# Patient Record
Sex: FEMALE | Race: OTHER | NOT HISPANIC OR LATINO
[De-identification: names, ages, dates, MRNs, and addresses within clinical notes are randomized per-mention and may not be internally consistent; named-entity substitution may affect disease eponyms.]

---

## 2019-06-07 ENCOUNTER — APPOINTMENT (OUTPATIENT)
Dept: SURGERY | Facility: CLINIC | Age: 49
End: 2019-06-07
Payer: COMMERCIAL

## 2019-06-07 VITALS
SYSTOLIC BLOOD PRESSURE: 154 MMHG | OXYGEN SATURATION: 98 % | WEIGHT: 133.38 LBS | HEART RATE: 82 BPM | HEIGHT: 68 IN | TEMPERATURE: 96.26 F | BODY MASS INDEX: 20.21 KG/M2 | DIASTOLIC BLOOD PRESSURE: 81 MMHG

## 2019-06-07 PROCEDURE — 99204 OFFICE O/P NEW MOD 45 MIN: CPT

## 2019-06-07 RX ORDER — OMEPRAZOLE 20 MG/1
20 CAPSULE, DELAYED RELEASE ORAL TWICE DAILY
Qty: 30 | Refills: 0 | Status: COMPLETED | COMMUNITY
Start: 2019-06-07

## 2019-06-11 NOTE — ASSESSMENT
[FreeTextEntry1] : Pt is a 49 y/o F s/p bypass surgery in 2014 at [facility] presents today for initial consultation for frequent emesis PO and abdominal pain. Patient will undergo a CT pelvis scan to evaluate her condition. Will follow up here to discuss results and plan of care.
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
## 2019-06-11 NOTE — END OF VISIT
[FreeTextEntry3] : All medical record entries made by the Scribe were at my, Dr. Anne's direction and personally dictated by me on 06/07/2019  I have reviewed the chart and agree that the record accurately reflects my personal performance of the history, physical exam, assessment and plan. I have also personally directed, reviewed, and agreed with the chart.\par \par

## 2019-06-11 NOTE — ADDENDUM
[FreeTextEntry1] : Documented by Zaynab Keith acting as a scribe for Dr. Merrill Anne on 06/07/2019\par

## 2019-06-26 ENCOUNTER — OUTPATIENT (OUTPATIENT)
Dept: OUTPATIENT SERVICES | Facility: HOSPITAL | Age: 49
LOS: 1 days | End: 2019-06-26
Payer: COMMERCIAL

## 2019-06-26 ENCOUNTER — APPOINTMENT (OUTPATIENT)
Dept: CT IMAGING | Facility: HOSPITAL | Age: 49
End: 2019-06-26
Payer: COMMERCIAL

## 2019-06-26 PROCEDURE — 74177 CT ABD & PELVIS W/CONTRAST: CPT

## 2019-06-26 PROCEDURE — 74177 CT ABD & PELVIS W/CONTRAST: CPT | Mod: 26

## 2019-07-30 ENCOUNTER — APPOINTMENT (OUTPATIENT)
Dept: SURGERY | Facility: CLINIC | Age: 49
End: 2019-07-30
Payer: COMMERCIAL

## 2019-07-30 VITALS
HEART RATE: 84 BPM | TEMPERATURE: 205.88 F | WEIGHT: 128.38 LBS | HEIGHT: 67 IN | SYSTOLIC BLOOD PRESSURE: 103 MMHG | DIASTOLIC BLOOD PRESSURE: 66 MMHG | OXYGEN SATURATION: 98 % | BODY MASS INDEX: 20.15 KG/M2

## 2019-07-30 PROCEDURE — 99213 OFFICE O/P EST LOW 20 MIN: CPT

## 2019-09-17 ENCOUNTER — APPOINTMENT (OUTPATIENT)
Dept: SURGERY | Facility: CLINIC | Age: 49
End: 2019-09-17

## 2019-09-23 ENCOUNTER — OUTPATIENT (OUTPATIENT)
Dept: OUTPATIENT SERVICES | Facility: HOSPITAL | Age: 49
LOS: 1 days | Discharge: HOME | End: 2019-09-23
Payer: COMMERCIAL

## 2019-09-23 ENCOUNTER — RESULT REVIEW (OUTPATIENT)
Age: 49
End: 2019-09-23

## 2019-09-23 PROCEDURE — 91035 G-ESOPH REFLX TST W/ELECTROD: CPT | Mod: 26

## 2019-09-23 PROCEDURE — 43239 EGD BIOPSY SINGLE/MULTIPLE: CPT

## 2019-09-24 LAB
SURGICAL PATHOLOGY STUDY: SIGNIFICANT CHANGE UP
SURGICAL PATHOLOGY STUDY: SIGNIFICANT CHANGE UP

## 2019-10-18 ENCOUNTER — APPOINTMENT (OUTPATIENT)
Dept: SURGERY | Facility: CLINIC | Age: 49
End: 2019-10-18
Payer: MEDICAID

## 2019-10-18 VITALS
BODY MASS INDEX: 21.19 KG/M2 | WEIGHT: 135 LBS | HEIGHT: 67 IN | SYSTOLIC BLOOD PRESSURE: 119 MMHG | DIASTOLIC BLOOD PRESSURE: 74 MMHG | TEMPERATURE: 205.7 F | HEART RATE: 81 BPM | OXYGEN SATURATION: 99 %

## 2019-10-18 DIAGNOSIS — K21.9 GASTRO-ESOPHAGEAL REFLUX DISEASE W/OUT ESOPHAGITIS: ICD-10-CM

## 2019-10-18 PROCEDURE — 99213 OFFICE O/P EST LOW 20 MIN: CPT

## 2019-10-18 NOTE — ASSESSMENT
[FreeTextEntry1] : 47 y/o F s/p gastric bypass in 2014 at [facility] by . Patient is upset that she cannot tolerate PO intake post-operatively. CT scan results reviewed shows a small lesion on her liver but otherwise WNL otherwise. Given that CT work up did not reveal any causes of her abdominal pain and recurrent vomiting, will have patient undergo endoscopy to assess her GI tract for any abnormality. Will return here after to discuss results and plan of care.

## 2019-10-18 NOTE — HISTORY OF PRESENT ILLNESS
[de-identified] : 49 y/o F s/p gastric bypass (2014, at [facility] by ) presents today for follow up on her abdominal pain and recurrent vomiting. She reports here feeling well. She states that she has completed her CT scan and presents here to review the results. She continues to experience PO intolerance, experiencing abdominal pain and vomiting postprandially. She states that she has had an endoscopy completed previously, about less than 6 months ago at [facility]. \par \par \par

## 2019-10-18 NOTE — ADDENDUM
[FreeTextEntry1] : Documented by Zaynab Keith acting as a scribe for Dr. Merrill Anne on 07/30/2019\par

## 2019-10-18 NOTE — END OF VISIT
[FreeTextEntry3] : All medical record entries made by the Scribe were at my, Dr. Anne's direction and personally dictated by me on 07/30/2019  I have reviewed the chart and agree that the record accurately reflects my personal performance of the history, physical exam, assessment and plan. I have also personally directed, reviewed, and agreed with the chart.\par \par

## 2019-10-22 PROBLEM — K21.9 CHRONIC GERD: Status: ACTIVE | Noted: 2019-10-22

## 2019-10-22 NOTE — PLAN
[FreeTextEntry1] : Revision  for medical necessity  with repair of hiatal hernia and  and  pouch revision

## 2019-10-22 NOTE — HISTORY OF PRESENT ILLNESS
[de-identified] : 49 y/o F s/p gastric bypass (2014, at [facility] by ) presents today for follow up on her abdominal pain and recurrent vomiting. Pt recently went for egd lobato with , 2cm hiatal hernia, no ulcerations, Demeester score of 52. Results reviewed with pt. States that since the procedure, she has noticed a decrease in her vomiting by 2-3 days weekly. States that she has gained 7 lbs since last visit. Reports eating rice and steak, explained to pt those foods are not well tolerated after bypass surgery, and to eliminate those foods replacing with quinoa and a ground beef to see if symptoms improve. Path negative for metaplasia/dysplasia. Pt taking vitamins daily as directed.

## 2019-10-31 ENCOUNTER — FORM ENCOUNTER (OUTPATIENT)
Age: 49
End: 2019-10-31

## 2019-11-01 ENCOUNTER — APPOINTMENT (OUTPATIENT)
Dept: HEART AND VASCULAR | Facility: CLINIC | Age: 49
End: 2019-11-01
Payer: COMMERCIAL

## 2019-11-01 ENCOUNTER — OUTPATIENT (OUTPATIENT)
Dept: OUTPATIENT SERVICES | Facility: HOSPITAL | Age: 49
LOS: 1 days | End: 2019-11-01
Payer: COMMERCIAL

## 2019-11-01 VITALS
WEIGHT: 135 LBS | HEIGHT: 67 IN | SYSTOLIC BLOOD PRESSURE: 120 MMHG | HEART RATE: 60 BPM | OXYGEN SATURATION: 98 % | BODY MASS INDEX: 21.19 KG/M2 | DIASTOLIC BLOOD PRESSURE: 70 MMHG

## 2019-11-01 PROCEDURE — 93000 ELECTROCARDIOGRAM COMPLETE: CPT

## 2019-11-01 PROCEDURE — 36415 COLL VENOUS BLD VENIPUNCTURE: CPT

## 2019-11-01 PROCEDURE — 99204 OFFICE O/P NEW MOD 45 MIN: CPT

## 2019-11-01 PROCEDURE — 71046 X-RAY EXAM CHEST 2 VIEWS: CPT

## 2019-11-01 PROCEDURE — 71046 X-RAY EXAM CHEST 2 VIEWS: CPT | Mod: 26

## 2019-11-01 RX ORDER — OMEPRAZOLE 20 MG/1
20 CAPSULE, DELAYED RELEASE ORAL TWICE DAILY
Qty: 30 | Refills: 0 | Status: DISCONTINUED | COMMUNITY
Start: 2019-06-07 | End: 2019-11-01

## 2019-11-01 RX ORDER — METOCLOPRAMIDE 5 MG/1
5 TABLET ORAL 3 TIMES DAILY
Qty: 30 | Refills: 0 | Status: DISCONTINUED | COMMUNITY
Start: 2019-06-07 | End: 2019-11-01

## 2019-11-01 RX ORDER — PYRIDOSTIGMINE BROMIDE 60 MG/1
60 TABLET ORAL 3 TIMES DAILY
Qty: 30 | Refills: 0 | Status: DISCONTINUED | COMMUNITY
Start: 2019-06-07 | End: 2019-11-01

## 2019-11-01 NOTE — PHYSICAL EXAM
[General Appearance - Well Developed] : well developed [Normal Appearance] : normal appearance [Well Groomed] : well groomed [General Appearance - Well Nourished] : well nourished [No Deformities] : no deformities [General Appearance - In No Acute Distress] : no acute distress [Normal Conjunctiva] : the conjunctiva exhibited no abnormalities [Eyelids - No Xanthelasma] : the eyelids demonstrated no xanthelasmas [Normal Oral Mucosa] : normal oral mucosa [No Oral Pallor] : no oral pallor [No Oral Cyanosis] : no oral cyanosis [Normal Jugular Venous A Waves Present] : normal jugular venous A waves present [Normal Jugular Venous V Waves Present] : normal jugular venous V waves present [No Jugular Venous Gates A Waves] : no jugular venous gates A waves [Respiration, Rhythm And Depth] : normal respiratory rhythm and effort [Exaggerated Use Of Accessory Muscles For Inspiration] : no accessory muscle use [Auscultation Breath Sounds / Voice Sounds] : lungs were clear to auscultation bilaterally [Heart Rate And Rhythm] : heart rate and rhythm were normal [Heart Sounds] : normal S1 and S2 [Murmurs] : no murmurs present [Abdomen Soft] : soft [Abdomen Tenderness] : non-tender [Abdomen Mass (___ Cm)] : no abdominal mass palpated [Abnormal Walk] : normal gait [Gait - Sufficient For Exercise Testing] : the gait was sufficient for exercise testing [Nail Clubbing] : no clubbing of the fingernails [Cyanosis, Localized] : no localized cyanosis [Petechial Hemorrhages (___cm)] : no petechial hemorrhages [] : no ischemic changes [Oriented To Time, Place, And Person] : oriented to person, place, and time [Affect] : the affect was normal [Mood] : the mood was normal [No Anxiety] : not feeling anxious

## 2019-11-01 NOTE — HISTORY OF PRESENT ILLNESS
[FreeTextEntry1] : for evaluation hiatal hernia repair and fundoplication\par good functional capacity\par no h/o bleeding tendencies

## 2019-11-04 LAB
ALBUMIN SERPL ELPH-MCNC: 4.5 G/DL
ALP BLD-CCNC: 87 U/L
ALT SERPL-CCNC: 10 U/L
ANION GAP SERPL CALC-SCNC: 11 MMOL/L
APPEARANCE: CLEAR
APTT BLD: 32.3 SEC
AST SERPL-CCNC: 13 U/L
BACTERIA: NEGATIVE
BASOPHILS # BLD AUTO: 0.03 K/UL
BASOPHILS NFR BLD AUTO: 0.3 %
BILIRUB SERPL-MCNC: 0.3 MG/DL
BILIRUBIN URINE: NEGATIVE
BLOOD URINE: NEGATIVE
BUN SERPL-MCNC: 11 MG/DL
CALCIUM SERPL-MCNC: 10.2 MG/DL
CHLORIDE SERPL-SCNC: 103 MMOL/L
CO2 SERPL-SCNC: 29 MMOL/L
COLOR: YELLOW
CREAT SERPL-MCNC: 0.64 MG/DL
EOSINOPHIL # BLD AUTO: 0.15 K/UL
EOSINOPHIL NFR BLD AUTO: 1.7 %
ESTIMATED AVERAGE GLUCOSE: 108 MG/DL
GLUCOSE QUALITATIVE U: NEGATIVE
GLUCOSE SERPL-MCNC: 85 MG/DL
HBA1C MFR BLD HPLC: 5.4 %
HCT VFR BLD CALC: 40.5 %
HGB BLD-MCNC: 12.1 G/DL
HYALINE CASTS: 0 /LPF
IMM GRANULOCYTES NFR BLD AUTO: 0.2 %
INR PPP: 0.95 RATIO
KETONES URINE: NEGATIVE
LEUKOCYTE ESTERASE URINE: NEGATIVE
LYMPHOCYTES # BLD AUTO: 2.06 K/UL
LYMPHOCYTES NFR BLD AUTO: 23.8 %
MAN DIFF?: NORMAL
MCHC RBC-ENTMCNC: 28.5 PG
MCHC RBC-ENTMCNC: 29.9 GM/DL
MCV RBC AUTO: 95.5 FL
MICROSCOPIC-UA: NORMAL
MONOCYTES # BLD AUTO: 0.34 K/UL
MONOCYTES NFR BLD AUTO: 3.9 %
NEUTROPHILS # BLD AUTO: 6.04 K/UL
NEUTROPHILS NFR BLD AUTO: 70.1 %
NITRITE URINE: NEGATIVE
PH URINE: 6.5
PLATELET # BLD AUTO: 231 K/UL
POTASSIUM SERPL-SCNC: 4.9 MMOL/L
PROT SERPL-MCNC: 6.9 G/DL
PROTEIN URINE: NEGATIVE
PT BLD: 10.9 SEC
RBC # BLD: 4.24 M/UL
RBC # FLD: 14 %
RED BLOOD CELLS URINE: 4 /HPF
SODIUM SERPL-SCNC: 143 MMOL/L
SPECIFIC GRAVITY URINE: 1.02
SQUAMOUS EPITHELIAL CELLS: 0 /HPF
T4 FREE SERPL-MCNC: 1.2 NG/DL
TSH SERPL-ACNC: 5.43 UIU/ML
UROBILINOGEN URINE: NORMAL
WBC # FLD AUTO: 8.64 K/UL
WHITE BLOOD CELLS URINE: 1 /HPF

## 2019-11-13 VITALS
RESPIRATION RATE: 16 BRPM | TEMPERATURE: 98 F | OXYGEN SATURATION: 98 % | HEART RATE: 55 BPM | SYSTOLIC BLOOD PRESSURE: 105 MMHG | DIASTOLIC BLOOD PRESSURE: 68 MMHG | HEIGHT: 68 IN | WEIGHT: 136.47 LBS

## 2019-11-13 NOTE — ASU PATIENT PROFILE, ADULT - PMH
Depression    GERD (gastroesophageal reflux disease)  chronic  Helicobacter pylori (H. pylori) infection    Hiatal hernia with GERD    Hypothyroid    Varicose veins Depression    GERD (gastroesophageal reflux disease)  chronic  Helicobacter pylori (H. pylori) infection    Hiatal hernia with GERD    Hypothyroid    Morbid obesity    Varicose veins

## 2019-11-14 ENCOUNTER — INPATIENT (INPATIENT)
Facility: HOSPITAL | Age: 49
LOS: 0 days | Discharge: HOME | DRG: 328 | End: 2019-11-15
Attending: SURGERY | Admitting: SURGERY
Payer: COMMERCIAL

## 2019-11-14 ENCOUNTER — APPOINTMENT (OUTPATIENT)
Dept: SURGERY | Facility: HOSPITAL | Age: 49
End: 2019-11-14

## 2019-11-14 DIAGNOSIS — Z98.84 BARIATRIC SURGERY STATUS: Chronic | ICD-10-CM

## 2019-11-14 DIAGNOSIS — Z98.890 OTHER SPECIFIED POSTPROCEDURAL STATES: Chronic | ICD-10-CM

## 2019-11-14 DIAGNOSIS — Z41.9 ENCOUNTER FOR PROCEDURE FOR PURPOSES OTHER THAN REMEDYING HEALTH STATE, UNSPECIFIED: Chronic | ICD-10-CM

## 2019-11-14 PROCEDURE — 43280 LAPAROSCOPY FUNDOPLASTY: CPT | Mod: GC

## 2019-11-14 PROCEDURE — S2900 ROBOTIC SURGICAL SYSTEM: CPT | Mod: NC

## 2019-11-14 RX ORDER — HYDROMORPHONE HYDROCHLORIDE 2 MG/ML
1 INJECTION INTRAMUSCULAR; INTRAVENOUS; SUBCUTANEOUS EVERY 4 HOURS
Refills: 0 | Status: DISCONTINUED | OUTPATIENT
Start: 2019-11-14 | End: 2019-11-15

## 2019-11-14 RX ORDER — SODIUM CHLORIDE 9 MG/ML
1000 INJECTION, SOLUTION INTRAVENOUS
Refills: 0 | Status: DISCONTINUED | OUTPATIENT
Start: 2019-11-14 | End: 2019-11-15

## 2019-11-14 RX ORDER — ENOXAPARIN SODIUM 100 MG/ML
40 INJECTION SUBCUTANEOUS DAILY
Refills: 0 | Status: DISCONTINUED | OUTPATIENT
Start: 2019-11-14 | End: 2019-11-15

## 2019-11-14 RX ORDER — LEVOTHYROXINE SODIUM 125 MCG
125 TABLET ORAL ONCE
Refills: 0 | Status: DISCONTINUED | OUTPATIENT
Start: 2019-11-14 | End: 2019-11-14

## 2019-11-14 RX ORDER — ONDANSETRON 8 MG/1
4 TABLET, FILM COATED ORAL EVERY 6 HOURS
Refills: 0 | Status: DISCONTINUED | OUTPATIENT
Start: 2019-11-14 | End: 2019-11-15

## 2019-11-14 RX ORDER — HYDROMORPHONE HYDROCHLORIDE 2 MG/ML
0.5 INJECTION INTRAMUSCULAR; INTRAVENOUS; SUBCUTANEOUS ONCE
Refills: 0 | Status: DISCONTINUED | OUTPATIENT
Start: 2019-11-14 | End: 2019-11-14

## 2019-11-14 RX ORDER — METOCLOPRAMIDE HCL 10 MG
10 TABLET ORAL EVERY 6 HOURS
Refills: 0 | Status: DISCONTINUED | OUTPATIENT
Start: 2019-11-14 | End: 2019-11-15

## 2019-11-14 RX ORDER — ONDANSETRON 8 MG/1
4 TABLET, FILM COATED ORAL ONCE
Refills: 0 | Status: DISCONTINUED | OUTPATIENT
Start: 2019-11-14 | End: 2019-11-14

## 2019-11-14 RX ORDER — HYDROMORPHONE HYDROCHLORIDE 2 MG/ML
0.5 INJECTION INTRAMUSCULAR; INTRAVENOUS; SUBCUTANEOUS EVERY 4 HOURS
Refills: 0 | Status: DISCONTINUED | OUTPATIENT
Start: 2019-11-14 | End: 2019-11-14

## 2019-11-14 RX ORDER — ACETAMINOPHEN 500 MG
1000 TABLET ORAL ONCE
Refills: 0 | Status: COMPLETED | OUTPATIENT
Start: 2019-11-15 | End: 2019-11-15

## 2019-11-14 RX ORDER — PANTOPRAZOLE SODIUM 20 MG/1
40 TABLET, DELAYED RELEASE ORAL DAILY
Refills: 0 | Status: DISCONTINUED | OUTPATIENT
Start: 2019-11-14 | End: 2019-11-15

## 2019-11-14 RX ORDER — HYDROMORPHONE HYDROCHLORIDE 2 MG/ML
1 INJECTION INTRAMUSCULAR; INTRAVENOUS; SUBCUTANEOUS ONCE
Refills: 0 | Status: DISCONTINUED | OUTPATIENT
Start: 2019-11-14 | End: 2019-11-14

## 2019-11-14 RX ORDER — LANOLIN ALCOHOL/MO/W.PET/CERES
5 CREAM (GRAM) TOPICAL AT BEDTIME
Refills: 0 | Status: DISCONTINUED | OUTPATIENT
Start: 2019-11-14 | End: 2019-11-15

## 2019-11-14 RX ORDER — KETOROLAC TROMETHAMINE 30 MG/ML
15 SYRINGE (ML) INJECTION EVERY 6 HOURS
Refills: 0 | Status: DISCONTINUED | OUTPATIENT
Start: 2019-11-14 | End: 2019-11-15

## 2019-11-14 RX ORDER — LEVOTHYROXINE SODIUM 125 MCG
125 TABLET ORAL DAILY
Refills: 0 | Status: DISCONTINUED | OUTPATIENT
Start: 2019-11-14 | End: 2019-11-15

## 2019-11-14 RX ORDER — ACETAMINOPHEN 500 MG
1000 TABLET ORAL ONCE
Refills: 0 | Status: COMPLETED | OUTPATIENT
Start: 2019-11-14 | End: 2019-11-14

## 2019-11-14 RX ADMIN — HYDROMORPHONE HYDROCHLORIDE 0.5 MILLIGRAM(S): 2 INJECTION INTRAMUSCULAR; INTRAVENOUS; SUBCUTANEOUS at 11:50

## 2019-11-14 RX ADMIN — Medication 400 MILLIGRAM(S): at 17:11

## 2019-11-14 RX ADMIN — Medication 1000 MILLIGRAM(S): at 18:00

## 2019-11-14 RX ADMIN — HYDROMORPHONE HYDROCHLORIDE 1 MILLIGRAM(S): 2 INJECTION INTRAMUSCULAR; INTRAVENOUS; SUBCUTANEOUS at 12:36

## 2019-11-14 RX ADMIN — Medication 15 MILLIGRAM(S): at 19:20

## 2019-11-14 RX ADMIN — HYDROMORPHONE HYDROCHLORIDE 1 MILLIGRAM(S): 2 INJECTION INTRAMUSCULAR; INTRAVENOUS; SUBCUTANEOUS at 15:27

## 2019-11-14 RX ADMIN — Medication 15 MILLIGRAM(S): at 13:30

## 2019-11-14 RX ADMIN — PANTOPRAZOLE SODIUM 40 MILLIGRAM(S): 20 TABLET, DELAYED RELEASE ORAL at 13:00

## 2019-11-14 RX ADMIN — ENOXAPARIN SODIUM 40 MILLIGRAM(S): 100 INJECTION SUBCUTANEOUS at 17:11

## 2019-11-14 RX ADMIN — HYDROMORPHONE HYDROCHLORIDE 1 MILLIGRAM(S): 2 INJECTION INTRAMUSCULAR; INTRAVENOUS; SUBCUTANEOUS at 16:40

## 2019-11-14 RX ADMIN — Medication 5 MILLIGRAM(S): at 23:02

## 2019-11-14 RX ADMIN — Medication 15 MILLIGRAM(S): at 18:39

## 2019-11-14 RX ADMIN — HYDROMORPHONE HYDROCHLORIDE 0.5 MILLIGRAM(S): 2 INJECTION INTRAMUSCULAR; INTRAVENOUS; SUBCUTANEOUS at 23:02

## 2019-11-14 RX ADMIN — Medication 15 MILLIGRAM(S): at 13:20

## 2019-11-14 NOTE — BRIEF OPERATIVE NOTE - NSICDXBRIEFPOSTOP_GEN_ALL_CORE_FT
POST-OP DIAGNOSIS:  S/P gastric bypass 14-Nov-2019 11:49:39  Randall Flanagan  Hiatal hernia with GERD 14-Nov-2019 11:49:35  Randall Flanagan

## 2019-11-14 NOTE — PROGRESS NOTE ADULT - SUBJECTIVE AND OBJECTIVE BOX
POST-OPERATIVE NOTE    Procedure: Robotic assisted Nissen fundoplication and hiatal hernia repair     Diagnosis/Indication: Sliding Hiatal hernia and GERD    Surgeon: Dr. Anne     S: Pt has no complaints. Denies CP, SOB, TORRES, calf tenderness. Pain controlled with medication.    O:  T(C): 36.6 (11-14-19 @ 13:30), Max: 37.1 (11-14-19 @ 11:42)  T(F): 97.8 (11-14-19 @ 13:30), Max: 98.7 (11-14-19 @ 11:42)  HR: 60 (11-14-19 @ 13:30) (58 - 68)  BP: 137/74 (11-14-19 @ 13:30) (124/73 - 146/82)  RR: 16 (11-14-19 @ 13:30) (14 - 31)  SpO2: 100% (11-14-19 @ 13:30) (97% - 100%)  Wt(kg): --            Gen: NAD, resting comfortably in bed  C/V: NSR  Pulm: Nonlabored breathing, no respiratory distress  Abd: Soft, non-distended, TTP around incision site, incision clean dry and intact  Extrem: WWP, no calf edema, SCDs in place POST-OPERATIVE NOTE    Procedure: Robotic assisted Nissen fundoplication    Diagnosis/Indication: Sliding Hiatal hernia and GERD    Surgeon: Dr. Anne     S: Pt has no complaints. Denies CP, SOB, TORRES, calf tenderness. Pain controlled with medication.    O:  T(C): 36.6 (11-14-19 @ 13:30), Max: 37.1 (11-14-19 @ 11:42)  T(F): 97.8 (11-14-19 @ 13:30), Max: 98.7 (11-14-19 @ 11:42)  HR: 60 (11-14-19 @ 13:30) (58 - 68)  BP: 137/74 (11-14-19 @ 13:30) (124/73 - 146/82)  RR: 16 (11-14-19 @ 13:30) (14 - 31)  SpO2: 100% (11-14-19 @ 13:30) (97% - 100%)  Wt(kg): --            Gen: NAD, resting comfortably in bed  C/V: NSR  Pulm: Nonlabored breathing, no respiratory distress  Abd: Soft, non-distended, TTP around incision site, incision clean dry and intact  Extrem: WWP, no calf edema, SCDs in place

## 2019-11-14 NOTE — BRIEF OPERATIVE NOTE - OPERATION/FINDINGS
Adhesions between liver and gastric pouch taken down. Esophagus mobilized, moderate sized hiatal hernia, repaired with non-absorbable barbed suture. Gastric remnant mobilized and Nissen fundoplication performed. Confirmed good perfusion to wrap and stomach by ICG fluorescence angiography.

## 2019-11-14 NOTE — H&P PST ADULT - ASSESSMENT
49F with history of gastric bypass with subsequent development of GERD and hiatal hernia presents electively for robotic-assisted hiatal hernia repair and fundoplication.   - Admit to surgery, regional bed  - OR for above procedure

## 2019-11-14 NOTE — PROGRESS NOTE ADULT - ASSESSMENT
49 year old female with history of gastric bypass presents for sliding hiatal hernia repair. Patient s/p Robotic assisted Nissen fundoplication and hiatal hernia repair.    -Pain/nausea control  -CLD, IVF  -Protonix  -Lovenox DVT ppx   -SCDs, OOB/A, IS  -AM labs  -f/u am labs   -richardson to gravity   -UGI AM 49 year old female with history of gastric bypass presents for sliding hiatal hernia repair. Patient s/p Robotic assisted Nissen fundoplication .    -Pain/nausea control  -CLD, IVF  -Protonix  -Lovenox DVT ppx   -SCDs, OOB/A, IS  -AM labs  -f/u am labs   -richardson to gravity   -UGI AM

## 2019-11-14 NOTE — BRIEF OPERATIVE NOTE - NSICDXBRIEFPREOP_GEN_ALL_CORE_FT
PRE-OP DIAGNOSIS:  S/P gastric bypass 14-Nov-2019 11:49:22  Randall Flanagan  Hiatal hernia with GERD 14-Nov-2019 11:49:13  Randall Flanagan

## 2019-11-14 NOTE — H&P PST ADULT - NSICDXPASTSURGICALHX_GEN_ALL_CORE_FT
PAST SURGICAL HISTORY:  Elective surgery left wrist    H/O arthroscopy of right knee     H/O gastric bypass 2014 at [facility]

## 2019-11-14 NOTE — PACU DISCHARGE NOTE - COMMENTS
Patient meets criteria for patient discharge, A&Ox4, Pain controlled with medication as ordered, VSS, X5 Lap sites clean, dry and intact-No drainage, No swelling, No redness, No hematomas, #16F Strong in place draining clear yellow urine, Patient has no nausea/vomiting, No chest pain, No SOB or difficulty breathing, moving all extremities, abdomen is flat, symmetrical distention with positive bowel sounds in all quadrants, Report given to Og on 9 Uris, patient is transported by bed.

## 2019-11-14 NOTE — H&P PST ADULT - HISTORY OF PRESENT ILLNESS
Patient is a 49 year old female with history of gastric bypass in 2014 and symptoms of GERD, with abdominal pain and vomiting. She had a pre-op workup with EGD and pH monitoring that revealed a 2cm hiatal hernia and was consistent with acid reflux, with a Demeester score of 52. SHe now presents electively for a robotic-assisted hiatal hernia repair and fundoplication. No complaints at present.

## 2019-11-14 NOTE — H&P PST ADULT - NSICDXPASTMEDICALHX_GEN_ALL_CORE_FT
PAST MEDICAL HISTORY:  Depression     GERD (gastroesophageal reflux disease) chronic    Helicobacter pylori (H. pylori) infection     Hiatal hernia with GERD     Hypothyroid     Morbid obesity     Varicose veins

## 2019-11-14 NOTE — BRIEF OPERATIVE NOTE - NSICDXBRIEFPROCEDURE_GEN_ALL_CORE_FT
PROCEDURES:  Nissen fundoplication, robotic assisted 14-Nov-2019 11:49:00  Randall Flanagan  Robot-assisted repair of sliding hiatal hernia 14-Nov-2019 11:48:42  Randall Flanagan

## 2019-11-15 ENCOUNTER — TRANSCRIPTION ENCOUNTER (OUTPATIENT)
Age: 49
End: 2019-11-15

## 2019-11-15 VITALS
OXYGEN SATURATION: 96 % | RESPIRATION RATE: 16 BRPM | SYSTOLIC BLOOD PRESSURE: 107 MMHG | DIASTOLIC BLOOD PRESSURE: 61 MMHG | TEMPERATURE: 98 F | HEART RATE: 58 BPM

## 2019-11-15 LAB
ANION GAP SERPL CALC-SCNC: 7 MMOL/L — SIGNIFICANT CHANGE UP (ref 5–17)
BUN SERPL-MCNC: 8 MG/DL — SIGNIFICANT CHANGE UP (ref 7–23)
CALCIUM SERPL-MCNC: 8.8 MG/DL — SIGNIFICANT CHANGE UP (ref 8.4–10.5)
CHLORIDE SERPL-SCNC: 104 MMOL/L — SIGNIFICANT CHANGE UP (ref 96–108)
CO2 SERPL-SCNC: 28 MMOL/L — SIGNIFICANT CHANGE UP (ref 22–31)
CREAT SERPL-MCNC: 0.56 MG/DL — SIGNIFICANT CHANGE UP (ref 0.5–1.3)
GLUCOSE SERPL-MCNC: 95 MG/DL — SIGNIFICANT CHANGE UP (ref 70–99)
HCT VFR BLD CALC: 30.8 % — LOW (ref 34.5–45)
HGB BLD-MCNC: 9.8 G/DL — LOW (ref 11.5–15.5)
MAGNESIUM SERPL-MCNC: 1.7 MG/DL — SIGNIFICANT CHANGE UP (ref 1.6–2.6)
MCHC RBC-ENTMCNC: 29 PG — SIGNIFICANT CHANGE UP (ref 27–34)
MCHC RBC-ENTMCNC: 31.8 GM/DL — LOW (ref 32–36)
MCV RBC AUTO: 91.1 FL — SIGNIFICANT CHANGE UP (ref 80–100)
NRBC # BLD: 0 /100 WBCS — SIGNIFICANT CHANGE UP (ref 0–0)
PHOSPHATE SERPL-MCNC: 3.1 MG/DL — SIGNIFICANT CHANGE UP (ref 2.5–4.5)
PLATELET # BLD AUTO: 141 K/UL — LOW (ref 150–400)
POTASSIUM SERPL-MCNC: 3.9 MMOL/L — SIGNIFICANT CHANGE UP (ref 3.5–5.3)
POTASSIUM SERPL-SCNC: 3.9 MMOL/L — SIGNIFICANT CHANGE UP (ref 3.5–5.3)
RBC # BLD: 3.38 M/UL — LOW (ref 3.8–5.2)
RBC # FLD: 13.7 % — SIGNIFICANT CHANGE UP (ref 10.3–14.5)
SODIUM SERPL-SCNC: 139 MMOL/L — SIGNIFICANT CHANGE UP (ref 135–145)
WBC # BLD: 9.82 K/UL — SIGNIFICANT CHANGE UP (ref 3.8–10.5)
WBC # FLD AUTO: 9.82 K/UL — SIGNIFICANT CHANGE UP (ref 3.8–10.5)

## 2019-11-15 PROCEDURE — 74241: CPT | Mod: 26

## 2019-11-15 RX ORDER — NICOTINE POLACRILEX 2 MG
1 GUM BUCCAL
Qty: 7 | Refills: 0
Start: 2019-11-15 | End: 2019-11-21

## 2019-11-15 RX ORDER — MAGNESIUM SULFATE 500 MG/ML
1 VIAL (ML) INJECTION ONCE
Refills: 0 | Status: COMPLETED | OUTPATIENT
Start: 2019-11-15 | End: 2019-11-15

## 2019-11-15 RX ORDER — SODIUM CHLORIDE 9 MG/ML
1000 INJECTION, SOLUTION INTRAVENOUS
Refills: 0 | Status: DISCONTINUED | OUTPATIENT
Start: 2019-11-15 | End: 2019-11-15

## 2019-11-15 RX ORDER — PANTOPRAZOLE SODIUM 20 MG/1
1 TABLET, DELAYED RELEASE ORAL
Qty: 7 | Refills: 0
Start: 2019-11-15 | End: 2019-11-21

## 2019-11-15 RX ADMIN — HYDROMORPHONE HYDROCHLORIDE 1 MILLIGRAM(S): 2 INJECTION INTRAMUSCULAR; INTRAVENOUS; SUBCUTANEOUS at 16:43

## 2019-11-15 RX ADMIN — Medication 15 MILLIGRAM(S): at 01:20

## 2019-11-15 RX ADMIN — HYDROMORPHONE HYDROCHLORIDE 1 MILLIGRAM(S): 2 INJECTION INTRAMUSCULAR; INTRAVENOUS; SUBCUTANEOUS at 12:09

## 2019-11-15 RX ADMIN — Medication 15 MILLIGRAM(S): at 05:41

## 2019-11-15 RX ADMIN — Medication 15 MILLIGRAM(S): at 00:26

## 2019-11-15 RX ADMIN — Medication 100 GRAM(S): at 08:54

## 2019-11-15 RX ADMIN — Medication 1000 MILLIGRAM(S): at 01:20

## 2019-11-15 RX ADMIN — HYDROMORPHONE HYDROCHLORIDE 1 MILLIGRAM(S): 2 INJECTION INTRAMUSCULAR; INTRAVENOUS; SUBCUTANEOUS at 06:25

## 2019-11-15 RX ADMIN — Medication 400 MILLIGRAM(S): at 05:41

## 2019-11-15 RX ADMIN — Medication 15 MILLIGRAM(S): at 13:00

## 2019-11-15 RX ADMIN — Medication 400 MILLIGRAM(S): at 00:26

## 2019-11-15 RX ADMIN — Medication 15 MILLIGRAM(S): at 12:10

## 2019-11-15 RX ADMIN — Medication 1000 MILLIGRAM(S): at 06:20

## 2019-11-15 RX ADMIN — Medication 125 MICROGRAM(S): at 05:41

## 2019-11-15 RX ADMIN — ENOXAPARIN SODIUM 40 MILLIGRAM(S): 100 INJECTION SUBCUTANEOUS at 12:09

## 2019-11-15 RX ADMIN — Medication 15 MILLIGRAM(S): at 06:20

## 2019-11-15 RX ADMIN — HYDROMORPHONE HYDROCHLORIDE 1 MILLIGRAM(S): 2 INJECTION INTRAMUSCULAR; INTRAVENOUS; SUBCUTANEOUS at 07:00

## 2019-11-15 RX ADMIN — HYDROMORPHONE HYDROCHLORIDE 1 MILLIGRAM(S): 2 INJECTION INTRAMUSCULAR; INTRAVENOUS; SUBCUTANEOUS at 13:00

## 2019-11-15 RX ADMIN — PANTOPRAZOLE SODIUM 40 MILLIGRAM(S): 20 TABLET, DELAYED RELEASE ORAL at 12:09

## 2019-11-15 RX ADMIN — HYDROMORPHONE HYDROCHLORIDE 0.5 MILLIGRAM(S): 2 INJECTION INTRAMUSCULAR; INTRAVENOUS; SUBCUTANEOUS at 00:00

## 2019-11-15 NOTE — DISCHARGE NOTE PROVIDER - NSDCCPCAREPLAN_GEN_ALL_CORE_FT
PRINCIPAL DISCHARGE DIAGNOSIS  Diagnosis: GERD (gastroesophageal reflux disease)  Assessment and Plan of Treatment:

## 2019-11-15 NOTE — DISCHARGE NOTE PROVIDER - HOSPITAL COURSE
49F with history of gastric bypass with subsequent development of GERD and hiatal hernia presented electively for robotic-assisted hiatal hernia repair and fundoplication on 11/14. Procedure was uncomplicated and post-op course was uneventful. UGI on POD#1 was negative for leak or obstruction and diet was advanced to clear liquids. At time of discharge the patient was tolerating an adequate amount of po intake and was stable and ready for discharge with follow-up as outpatient.

## 2019-11-15 NOTE — PROGRESS NOTE ADULT - ASSESSMENT
49 year old female with history of gastric bypass,  s/p  robotic assisted Nissen fundoplication and hiatal hernia repair.    -Pain/nausea control  -CLD, IVF  -Protonix  -Lovenox DVT ppx   -SCDs, OOB/A, IS  -AM labs  -UGI this AM  -richardson out, TOV  -dispo pending UGI results and PO tolerance

## 2019-11-15 NOTE — PROGRESS NOTE ADULT - SUBJECTIVE AND OBJECTIVE BOX
POD: 1    Procedure: Robotic assisted Nissen fundoplication and hiatal hernia repair    SUBJECTIVE: Patient seen and examined by chief resident on morning rounds. Resting comfortably in bed. Reports having pain overnight but states that it is currently well controlled. Tolerated clears yesterday with no n/v.       Vital Signs Last 24 Hrs  T(C): 37 (15 Nov 2019 08:50), Max: 37.1 (14 Nov 2019 11:42)  T(F): 98.6 (15 Nov 2019 08:50), Max: 98.8 (15 Nov 2019 00:26)  HR: 60 (15 Nov 2019 08:50) (57 - 68)  BP: 127/81 (15 Nov 2019 08:50) (110/69 - 146/84)  BP(mean): 93 (14 Nov 2019 13:45) (93 - 108)  RR: 16 (15 Nov 2019 08:50) (14 - 31)  SpO2: 97% (15 Nov 2019 08:50) (96% - 100%)    Physical Exam:  General: NAD  Pulmonary: Nonlabored breathing, no respiratory distress  Abdominal: soft, minimally distended, appropriately tender with no rebound or guarding. incisions CDI   Extremities: WWP, normal strength, no clubbing/cyanosis/edema  Neuro: A/O x3    Lines/drains/tubes:    I&O's Summary    14 Nov 2019 07:01  -  15 Nov 2019 07:00  --------------------------------------------------------  IN: 2350 mL / OUT: 2720 mL / NET: -370 mL        LABS:                        9.8    9.82  )-----------( 141      ( 15 Nov 2019 07:12 )             30.8     11-15    139  |  104  |  8   ----------------------------<  95  3.9   |  28  |  0.56    Ca    8.8      15 Nov 2019 07:12  Phos  3.1     11-15  Mg     1.7     11-15          CAPILLARY BLOOD GLUCOSE            RADIOLOGY & ADDITIONAL STUDIES:

## 2019-11-15 NOTE — DISCHARGE NOTE PROVIDER - NSDCMRMEDTOKEN_GEN_ALL_CORE_FT
Synthroid 125 mcg (0.125 mg) oral tablet: 1 tab(s) orally once a day  Therapeutic Multiple Vitamins oral tablet: 1 tab(s) orally once a day Nicoderm C-Q 7 mg/24 hr transdermal film, extended release: 1 patch transdermally once a day MDD:1 patch  Percocet 5/325 oral tablet: 1 tab(s) orally every 6 hours, As Needed -for severe pain MDD:4 tabs   Protonix 40 mg oral delayed release tablet: 1 tab(s) orally once a day   Synthroid 125 mcg (0.125 mg) oral tablet: 1 tab(s) orally once a day  Therapeutic Multiple Vitamins oral tablet: 1 tab(s) orally once a day

## 2019-11-15 NOTE — DISCHARGE NOTE PROVIDER - CARE PROVIDER_API CALL
Merrill Anne)  Surgery  [address] [place] [address]  Phone: [phone]  Fax: [phone]  Follow Up Time:

## 2019-11-15 NOTE — DISCHARGE NOTE PROVIDER - NSDCFUADDINST_GEN_ALL_CORE_FT
Follow up with Dr. Anne in 1 week. Call the office at  to schedule your appointment. You may shower; soap and water over incision sites. Do not scrub. Pat dry when done. No tub bathing or swimming until cleared. Keep incision sites out of the sun as scars will darken. No heavy lifting (>10lbs) or strenuous exercise. Diet: CLD. 60 grams protein daily.  64 fluid ounces water daily. Drink small sips throughout the day. Call the office if you experience increasing abdominal pain, nausea, vomiting, or temperature >100.4F.  NO ASPIRIN OR NSAIDs until approved by Dr. Anne. Avoid alcoholic beverages until cleared by Dr. Anne.

## 2019-11-20 PROBLEM — E03.9 HYPOTHYROIDISM, UNSPECIFIED: Chronic | Status: ACTIVE | Noted: 2019-11-13

## 2019-11-20 PROBLEM — F32.9 MAJOR DEPRESSIVE DISORDER, SINGLE EPISODE, UNSPECIFIED: Chronic | Status: ACTIVE | Noted: 2019-11-13

## 2019-11-20 PROBLEM — A04.8 OTHER SPECIFIED BACTERIAL INTESTINAL INFECTIONS: Chronic | Status: ACTIVE | Noted: 2019-11-13

## 2019-11-20 PROBLEM — K21.9 GASTRO-ESOPHAGEAL REFLUX DISEASE WITHOUT ESOPHAGITIS: Chronic | Status: ACTIVE | Noted: 2019-11-13

## 2019-11-20 PROBLEM — I83.90 ASYMPTOMATIC VARICOSE VEINS OF UNSPECIFIED LOWER EXTREMITY: Chronic | Status: ACTIVE | Noted: 2019-11-13

## 2019-11-20 PROBLEM — E66.01 MORBID (SEVERE) OBESITY DUE TO EXCESS CALORIES: Chronic | Status: ACTIVE | Noted: 2019-11-14

## 2019-11-26 DIAGNOSIS — K44.9 DIAPHRAGMATIC HERNIA WITHOUT OBSTRUCTION OR GANGRENE: ICD-10-CM

## 2019-11-26 DIAGNOSIS — F32.9 MAJOR DEPRESSIVE DISORDER, SINGLE EPISODE, UNSPECIFIED: ICD-10-CM

## 2019-11-26 DIAGNOSIS — K21.9 GASTRO-ESOPHAGEAL REFLUX DISEASE WITHOUT ESOPHAGITIS: ICD-10-CM

## 2019-11-26 DIAGNOSIS — Z98.84 BARIATRIC SURGERY STATUS: ICD-10-CM

## 2019-11-26 DIAGNOSIS — E03.9 HYPOTHYROIDISM, UNSPECIFIED: ICD-10-CM

## 2019-11-26 PROCEDURE — 86901 BLOOD TYPING SEROLOGIC RH(D): CPT

## 2019-11-26 PROCEDURE — 85027 COMPLETE CBC AUTOMATED: CPT

## 2019-11-26 PROCEDURE — 36415 COLL VENOUS BLD VENIPUNCTURE: CPT

## 2019-11-26 PROCEDURE — 86900 BLOOD TYPING SEROLOGIC ABO: CPT

## 2019-11-26 PROCEDURE — 83735 ASSAY OF MAGNESIUM: CPT

## 2019-11-26 PROCEDURE — 86850 RBC ANTIBODY SCREEN: CPT

## 2019-11-26 PROCEDURE — 80048 BASIC METABOLIC PNL TOTAL CA: CPT

## 2019-11-26 PROCEDURE — 84100 ASSAY OF PHOSPHORUS: CPT

## 2019-11-26 PROCEDURE — S2900: CPT

## 2019-11-26 PROCEDURE — 74241: CPT

## 2020-01-07 ENCOUNTER — APPOINTMENT (OUTPATIENT)
Dept: SURGERY | Facility: CLINIC | Age: 50
End: 2020-01-07
Payer: MEDICAID

## 2020-01-15 ENCOUNTER — APPOINTMENT (OUTPATIENT)
Dept: HEART AND VASCULAR | Facility: CLINIC | Age: 50
End: 2020-01-15
Payer: MEDICAID

## 2020-01-15 ENCOUNTER — APPOINTMENT (OUTPATIENT)
Dept: HEART AND VASCULAR | Facility: CLINIC | Age: 50
End: 2020-01-15

## 2020-01-15 VITALS
HEART RATE: 88 BPM | WEIGHT: 140 LBS | HEIGHT: 67 IN | BODY MASS INDEX: 21.97 KG/M2 | SYSTOLIC BLOOD PRESSURE: 110 MMHG | OXYGEN SATURATION: 98 % | DIASTOLIC BLOOD PRESSURE: 60 MMHG

## 2020-01-15 DIAGNOSIS — R01.1 CARDIAC MURMUR, UNSPECIFIED: ICD-10-CM

## 2020-01-15 DIAGNOSIS — R00.2 PALPITATIONS: ICD-10-CM

## 2020-01-15 DIAGNOSIS — F17.200 NICOTINE DEPENDENCE, UNSPECIFIED, UNCOMPLICATED: ICD-10-CM

## 2020-01-15 PROCEDURE — 93306 TTE W/DOPPLER COMPLETE: CPT

## 2020-01-15 PROCEDURE — 99214 OFFICE O/P EST MOD 30 MIN: CPT

## 2020-01-15 RX ORDER — DIHYDROERGOTAMINE MESYLATE NASAL 4 MG/ML
4 SPRAY, METERED NASAL
Qty: 8 | Refills: 0 | Status: COMPLETED | COMMUNITY
Start: 2020-01-10

## 2020-01-15 RX ORDER — CETIRIZINE HYDROCHLORIDE 10 MG/1
10 TABLET, COATED ORAL
Qty: 30 | Refills: 0 | Status: COMPLETED | COMMUNITY
Start: 2019-09-20

## 2020-01-15 RX ORDER — METHYLPREDNISOLONE 4 MG/1
4 TABLET ORAL
Qty: 21 | Refills: 0 | Status: COMPLETED | COMMUNITY
Start: 2019-07-18

## 2020-01-15 RX ORDER — CLINDAMYCIN HYDROCHLORIDE 300 MG/1
300 CAPSULE ORAL
Qty: 30 | Refills: 0 | Status: COMPLETED | COMMUNITY
Start: 2019-07-18

## 2020-01-15 RX ORDER — OXYCODONE AND ACETAMINOPHEN 10; 325 MG/1; MG/1
10-325 TABLET ORAL
Refills: 0 | Status: DISCONTINUED | COMMUNITY
Start: 2019-11-01 | End: 2020-01-15

## 2020-01-15 RX ORDER — ACETAMINOPHEN AND CODEINE 300; 30 MG/1; MG/1
300-30 TABLET ORAL
Qty: 20 | Refills: 0 | Status: COMPLETED | COMMUNITY
Start: 2020-01-02

## 2020-01-15 NOTE — DISCUSSION/SUMMARY
[FreeTextEntry1] : stable exam\par hypothyroid- cont current dose f/u 6 weeks for TFTs\par restart celexa for psych symptoms\par echo results discussed no significant valve disease

## 2020-01-15 NOTE — PHYSICAL EXAM
[Normal Appearance] : normal appearance [General Appearance - Well Developed] : well developed [Well Groomed] : well groomed [General Appearance - Well Nourished] : well nourished [No Deformities] : no deformities [General Appearance - In No Acute Distress] : no acute distress [Normal Conjunctiva] : the conjunctiva exhibited no abnormalities [Normal Oral Mucosa] : normal oral mucosa [Eyelids - No Xanthelasma] : the eyelids demonstrated no xanthelasmas [No Oral Pallor] : no oral pallor [No Oral Cyanosis] : no oral cyanosis [Normal Jugular Venous A Waves Present] : normal jugular venous A waves present [Normal Jugular Venous V Waves Present] : normal jugular venous V waves present [No Jugular Venous Gates A Waves] : no jugular venous gates A waves [Respiration, Rhythm And Depth] : normal respiratory rhythm and effort [Exaggerated Use Of Accessory Muscles For Inspiration] : no accessory muscle use [Auscultation Breath Sounds / Voice Sounds] : lungs were clear to auscultation bilaterally [Heart Rate And Rhythm] : heart rate and rhythm were normal [Heart Sounds] : normal S1 and S2 [Abdomen Soft] : soft [Abdomen Tenderness] : non-tender [Abdomen Mass (___ Cm)] : no abdominal mass palpated [Gait - Sufficient For Exercise Testing] : the gait was sufficient for exercise testing [Abnormal Walk] : normal gait [Cyanosis, Localized] : no localized cyanosis [Nail Clubbing] : no clubbing of the fingernails [Petechial Hemorrhages (___cm)] : no petechial hemorrhages [] : no ischemic changes [Oriented To Time, Place, And Person] : oriented to person, place, and time [Affect] : the affect was normal [Mood] : the mood was normal [No Anxiety] : not feeling anxious

## 2020-01-15 NOTE — HISTORY OF PRESENT ILLNESS
[FreeTextEntry1] : under increased stress lost job in July c/o palpiations, feeling depressed, trouble sleeping\par hypothyroid- needs script renew, had dose changes a couple of weeks ago

## 2020-01-15 NOTE — REVIEW OF SYSTEMS
[Recent Weight Loss (___ Lbs)] : no recent weight loss [Joint Pain] : joint pain [see HPI] : see HPI [Anxiety] : anxiety [Under Stress] : under stress [Negative] : Endocrine

## 2020-01-15 NOTE — REASON FOR VISIT
[Follow-Up - Clinic] : a clinic follow-up of [Palpitations] : palpitations [FreeTextEntry1] : heart murmur, hypothyroid

## 2020-01-21 ENCOUNTER — APPOINTMENT (OUTPATIENT)
Dept: SURGERY | Facility: CLINIC | Age: 50
End: 2020-01-21
Payer: MEDICAID

## 2020-01-21 VITALS
SYSTOLIC BLOOD PRESSURE: 123 MMHG | BODY MASS INDEX: 21.25 KG/M2 | HEIGHT: 67 IN | OXYGEN SATURATION: 98 % | TEMPERATURE: 209.12 F | HEART RATE: 72 BPM | WEIGHT: 135.38 LBS | DIASTOLIC BLOOD PRESSURE: 82 MMHG

## 2020-01-21 PROCEDURE — 99024 POSTOP FOLLOW-UP VISIT: CPT
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
## 2020-01-21 NOTE — END OF VISIT
[FreeTextEntry3] : All medical record entries made by the Scribe were at my, Dr. Anne's, discretion and personally dictated by me on 01/21/2020. I have reviewed the chart and agree that the record accurately reflects my personal performance of the history, physical exam, assessment and plan. I have also personally directed, reviewed and agreed to the chart.

## 2020-01-21 NOTE — ADDENDUM
[FreeTextEntry1] : This note was written by Sherry Rojas on 01/21/2020 acting as scribe for Dr. Anne

## 2020-01-21 NOTE — PLAN
[FreeTextEntry1] : Pt to return in 1 mo\par Pt to be sent bariatric vitamins\par Pt to meet with nutrition

## 2020-02-25 ENCOUNTER — APPOINTMENT (OUTPATIENT)
Dept: SURGERY | Facility: CLINIC | Age: 50
End: 2020-02-25

## 2020-02-26 ENCOUNTER — APPOINTMENT (OUTPATIENT)
Dept: HEART AND VASCULAR | Facility: CLINIC | Age: 50
End: 2020-02-26

## 2020-02-28 ENCOUNTER — OUTPATIENT (OUTPATIENT)
Dept: OUTPATIENT SERVICES | Facility: HOSPITAL | Age: 50
LOS: 1 days | End: 2020-02-28
Payer: COMMERCIAL

## 2020-02-28 ENCOUNTER — APPOINTMENT (OUTPATIENT)
Dept: ULTRASOUND IMAGING | Facility: HOSPITAL | Age: 50
End: 2020-02-28
Payer: MEDICAID

## 2020-02-28 DIAGNOSIS — Z98.84 BARIATRIC SURGERY STATUS: Chronic | ICD-10-CM

## 2020-02-28 DIAGNOSIS — Z41.9 ENCOUNTER FOR PROCEDURE FOR PURPOSES OTHER THAN REMEDYING HEALTH STATE, UNSPECIFIED: Chronic | ICD-10-CM

## 2020-02-28 DIAGNOSIS — Z98.890 OTHER SPECIFIED POSTPROCEDURAL STATES: Chronic | ICD-10-CM

## 2020-02-28 PROCEDURE — 76700 US EXAM ABDOM COMPLETE: CPT

## 2020-02-28 PROCEDURE — 76700 US EXAM ABDOM COMPLETE: CPT | Mod: 26

## 2020-03-03 ENCOUNTER — APPOINTMENT (OUTPATIENT)
Dept: SURGERY | Facility: CLINIC | Age: 50
End: 2020-03-03
Payer: MEDICAID

## 2020-03-03 VITALS
HEART RATE: 76 BPM | DIASTOLIC BLOOD PRESSURE: 72 MMHG | SYSTOLIC BLOOD PRESSURE: 112 MMHG | TEMPERATURE: 98.2 F | WEIGHT: 136.5 LBS | HEIGHT: 67 IN | OXYGEN SATURATION: 98 % | BODY MASS INDEX: 21.43 KG/M2

## 2020-03-03 PROCEDURE — 99213 OFFICE O/P EST LOW 20 MIN: CPT
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
## 2020-05-21 LAB
25(OH)D3 SERPL-MCNC: 16.4 NG/ML
A-TOCOPHEROL VIT E SERPL-MCNC: 8.8 MG/L
ALBUMIN SERPL ELPH-MCNC: 5 G/DL
ALP BLD-CCNC: 158 U/L
ALT SERPL-CCNC: 105 U/L
ANION GAP SERPL CALC-SCNC: 11 MMOL/L
AST SERPL-CCNC: 29 U/L
BASOPHILS # BLD AUTO: 0.06 K/UL
BASOPHILS NFR BLD AUTO: 0.7 %
BETA+GAMMA TOCOPHEROL SERPL-MCNC: 1 MG/L
BILIRUB SERPL-MCNC: 0.3 MG/DL
BUN SERPL-MCNC: 12 MG/DL
CA-I SERPL-SCNC: 1.35 MMOL/L
CALCIUM SERPL-MCNC: 10.2 MG/DL
CALCIUM SERPL-MCNC: 10.2 MG/DL
CHLORIDE SERPL-SCNC: 103 MMOL/L
CHOLEST SERPL-MCNC: 172 MG/DL
CHOLEST/HDLC SERPL: 2.6 RATIO
CO2 SERPL-SCNC: 29 MMOL/L
CREAT SERPL-MCNC: 0.63 MG/DL
EOSINOPHIL # BLD AUTO: 0.08 K/UL
EOSINOPHIL NFR BLD AUTO: 1 %
ESTIMATED AVERAGE GLUCOSE: 105 MG/DL
FOLATE SERPL-MCNC: 6 NG/ML
GLUCOSE SERPL-MCNC: 85 MG/DL
HBA1C MFR BLD HPLC: 5.3 %
HCT VFR BLD CALC: 41.8 %
HDLC SERPL-MCNC: 67 MG/DL
HGB BLD-MCNC: 13 G/DL
IMM GRANULOCYTES NFR BLD AUTO: 0.4 %
INR PPP: 1.01 RATIO
IRON SATN MFR SERPL: 15 %
IRON SERPL-MCNC: 55 UG/DL
LDLC SERPL CALC-MCNC: 89 MG/DL
LYMPHOCYTES # BLD AUTO: 2.88 K/UL
LYMPHOCYTES NFR BLD AUTO: 35.6 %
MAN DIFF?: NORMAL
MCHC RBC-ENTMCNC: 29 PG
MCHC RBC-ENTMCNC: 31.1 GM/DL
MCV RBC AUTO: 93.3 FL
MONOCYTES # BLD AUTO: 0.43 K/UL
MONOCYTES NFR BLD AUTO: 5.3 %
NEUTROPHILS # BLD AUTO: 4.6 K/UL
NEUTROPHILS NFR BLD AUTO: 57 %
PARATHYROID HORMONE INTACT: 41 PG/ML
PLATELET # BLD AUTO: 256 K/UL
POTASSIUM SERPL-SCNC: 4.5 MMOL/L
PREALB SERPL NEPH-MCNC: 25 MG/DL
PROT SERPL-MCNC: 7.6 G/DL
PT BLD: 11.4 SEC
RBC # BLD: 4.48 M/UL
RBC # FLD: 14 %
SODIUM SERPL-SCNC: 143 MMOL/L
TIBC SERPL-MCNC: 361 UG/DL
TRIGL SERPL-MCNC: 77 MG/DL
TSH SERPL-ACNC: 2.25 UIU/ML
UIBC SERPL-MCNC: 307 UG/DL
VIT A SERPL-MCNC: 36.6 UG/DL
VIT B1 SERPL-MCNC: 112.6 NMOL/L
VIT B12 SERPL-MCNC: 499 PG/ML
WBC # FLD AUTO: 8.08 K/UL
ZINC SERPL-MCNC: 99 UG/DL

## 2020-06-29 ENCOUNTER — APPOINTMENT (OUTPATIENT)
Dept: HEART AND VASCULAR | Facility: CLINIC | Age: 50
End: 2020-06-29
Payer: MEDICAID

## 2020-06-29 PROCEDURE — 99212 OFFICE O/P EST SF 10 MIN: CPT | Mod: 95

## 2020-07-15 ENCOUNTER — RX RENEWAL (OUTPATIENT)
Age: 50
End: 2020-07-15

## 2020-11-11 ENCOUNTER — APPOINTMENT (OUTPATIENT)
Dept: HEART AND VASCULAR | Facility: CLINIC | Age: 50
End: 2020-11-11
Payer: MEDICAID

## 2020-11-11 VITALS
HEART RATE: 68 BPM | SYSTOLIC BLOOD PRESSURE: 90 MMHG | DIASTOLIC BLOOD PRESSURE: 60 MMHG | OXYGEN SATURATION: 98 % | TEMPERATURE: 97.5 F | HEIGHT: 67 IN

## 2020-11-11 PROCEDURE — 93000 ELECTROCARDIOGRAM COMPLETE: CPT

## 2020-11-11 PROCEDURE — 36415 COLL VENOUS BLD VENIPUNCTURE: CPT

## 2020-11-11 PROCEDURE — 99396 PREV VISIT EST AGE 40-64: CPT

## 2020-11-11 PROCEDURE — 99072 ADDL SUPL MATRL&STAF TM PHE: CPT

## 2020-11-11 NOTE — PHYSICAL EXAM
[General Appearance - Well Developed] : well developed [Normal Appearance] : normal appearance [Well Groomed] : well groomed [General Appearance - Well Nourished] : well nourished [No Deformities] : no deformities [General Appearance - In No Acute Distress] : no acute distress [Normal Conjunctiva] : the conjunctiva exhibited no abnormalities [Eyelids - No Xanthelasma] : the eyelids demonstrated no xanthelasmas [Normal Oral Mucosa] : normal oral mucosa [No Oral Pallor] : no oral pallor [No Oral Cyanosis] : no oral cyanosis [Normal Jugular Venous A Waves Present] : normal jugular venous A waves present [Normal Jugular Venous V Waves Present] : normal jugular venous V waves present [No Jugular Venous Gates A Waves] : no jugular venous gates A waves [Respiration, Rhythm And Depth] : normal respiratory rhythm and effort [Exaggerated Use Of Accessory Muscles For Inspiration] : no accessory muscle use [Auscultation Breath Sounds / Voice Sounds] : lungs were clear to auscultation bilaterally [Heart Rate And Rhythm] : heart rate and rhythm were normal [Heart Sounds] : normal S1 and S2 [Abdomen Soft] : soft [Abdomen Tenderness] : non-tender [Abdomen Mass (___ Cm)] : no abdominal mass palpated [Abnormal Walk] : normal gait [Gait - Sufficient For Exercise Testing] : the gait was sufficient for exercise testing [Nail Clubbing] : no clubbing of the fingernails [Cyanosis, Localized] : no localized cyanosis [Petechial Hemorrhages (___cm)] : no petechial hemorrhages [] : no ischemic changes [Oriented To Time, Place, And Person] : oriented to person, place, and time [Affect] : the affect was normal [Mood] : the mood was normal [No Anxiety] : not feeling anxious

## 2020-11-11 NOTE — REVIEW OF SYSTEMS
[Recent Weight Loss (___ Lbs)] : no recent weight loss [see HPI] : see HPI [Joint Pain] : joint pain [Anxiety] : anxiety [Under Stress] : under stress [Negative] : Heme/Lymph

## 2020-11-12 LAB
25(OH)D3 SERPL-MCNC: 16.2 NG/ML
ALBUMIN SERPL ELPH-MCNC: 3.2 G/DL
ALP BLD-CCNC: 102 U/L
ALT SERPL-CCNC: 9 U/L
ANION GAP SERPL CALC-SCNC: 10 MMOL/L
APPEARANCE: CLEAR
AST SERPL-CCNC: 16 U/L
BACTERIA: NEGATIVE
BASOPHILS # BLD AUTO: 0.05 K/UL
BASOPHILS NFR BLD AUTO: 0.5 %
BILIRUB SERPL-MCNC: <0.2 MG/DL
BILIRUBIN URINE: NEGATIVE
BLOOD URINE: NEGATIVE
BUN SERPL-MCNC: 13 MG/DL
CALCIUM SERPL-MCNC: 9 MG/DL
CHLORIDE SERPL-SCNC: 107 MMOL/L
CHOLEST SERPL-MCNC: 127 MG/DL
CO2 SERPL-SCNC: 28 MMOL/L
COLOR: YELLOW
CREAT SERPL-MCNC: 0.66 MG/DL
EOSINOPHIL # BLD AUTO: 0.14 K/UL
EOSINOPHIL NFR BLD AUTO: 1.5 %
ESTIMATED AVERAGE GLUCOSE: 108 MG/DL
GLUCOSE QUALITATIVE U: NEGATIVE
GLUCOSE SERPL-MCNC: 77 MG/DL
HBA1C MFR BLD HPLC: 5.4 %
HCT VFR BLD CALC: 36.1 %
HDLC SERPL-MCNC: 55 MG/DL
HGB BLD-MCNC: 11.2 G/DL
HYALINE CASTS: 1 /LPF
IMM GRANULOCYTES NFR BLD AUTO: 0.1 %
KETONES URINE: NEGATIVE
LDLC SERPL CALC-MCNC: 58 MG/DL
LEUKOCYTE ESTERASE URINE: NEGATIVE
LYMPHOCYTES # BLD AUTO: 2.94 K/UL
LYMPHOCYTES NFR BLD AUTO: 31.2 %
MAN DIFF?: NORMAL
MCHC RBC-ENTMCNC: 28.4 PG
MCHC RBC-ENTMCNC: 31 GM/DL
MCV RBC AUTO: 91.4 FL
MICROSCOPIC-UA: NORMAL
MONOCYTES # BLD AUTO: 0.43 K/UL
MONOCYTES NFR BLD AUTO: 4.6 %
NEUTROPHILS # BLD AUTO: 5.86 K/UL
NEUTROPHILS NFR BLD AUTO: 62.1 %
NITRITE URINE: NEGATIVE
NONHDLC SERPL-MCNC: 72 MG/DL
PH URINE: 5.5
PLATELET # BLD AUTO: 231 K/UL
POTASSIUM SERPL-SCNC: 4.4 MMOL/L
PROT SERPL-MCNC: 5.5 G/DL
PROTEIN URINE: NEGATIVE
RBC # BLD: 3.95 M/UL
RBC # FLD: 16.5 %
RED BLOOD CELLS URINE: 2 /HPF
SODIUM SERPL-SCNC: 146 MMOL/L
SPECIFIC GRAVITY URINE: 1.03
SQUAMOUS EPITHELIAL CELLS: 1 /HPF
T3FREE SERPL-MCNC: 2.51 PG/ML
T4 FREE SERPL-MCNC: 0.9 NG/DL
TRIGL SERPL-MCNC: 69 MG/DL
TSH SERPL-ACNC: 7.96 UIU/ML
UROBILINOGEN URINE: ABNORMAL
WBC # FLD AUTO: 9.43 K/UL
WHITE BLOOD CELLS URINE: 0 /HPF

## 2020-11-13 LAB
SARS-COV-2 IGG SERPL IA-ACNC: <0.1 INDEX
SARS-COV-2 IGG SERPL QL IA: NEGATIVE

## 2020-11-23 ENCOUNTER — RESULT REVIEW (OUTPATIENT)
Age: 50
End: 2020-11-23

## 2020-11-23 ENCOUNTER — OUTPATIENT (OUTPATIENT)
Dept: OUTPATIENT SERVICES | Facility: HOSPITAL | Age: 50
LOS: 1 days | End: 2020-11-23

## 2020-11-23 ENCOUNTER — APPOINTMENT (OUTPATIENT)
Dept: MAMMOGRAPHY | Facility: CLINIC | Age: 50
End: 2020-11-23
Payer: MEDICAID

## 2020-11-23 DIAGNOSIS — Z98.890 OTHER SPECIFIED POSTPROCEDURAL STATES: Chronic | ICD-10-CM

## 2020-11-23 DIAGNOSIS — Z41.9 ENCOUNTER FOR PROCEDURE FOR PURPOSES OTHER THAN REMEDYING HEALTH STATE, UNSPECIFIED: Chronic | ICD-10-CM

## 2020-11-23 DIAGNOSIS — Z98.84 BARIATRIC SURGERY STATUS: Chronic | ICD-10-CM

## 2020-11-23 PROCEDURE — 77067 SCR MAMMO BI INCL CAD: CPT | Mod: 26

## 2020-11-23 PROCEDURE — 77063 BREAST TOMOSYNTHESIS BI: CPT | Mod: 26

## 2020-11-30 ENCOUNTER — APPOINTMENT (OUTPATIENT)
Dept: ULTRASOUND IMAGING | Facility: CLINIC | Age: 50
End: 2020-11-30
Payer: MEDICAID

## 2020-11-30 ENCOUNTER — OUTPATIENT (OUTPATIENT)
Dept: OUTPATIENT SERVICES | Facility: HOSPITAL | Age: 50
LOS: 1 days | End: 2020-11-30

## 2020-11-30 ENCOUNTER — RESULT REVIEW (OUTPATIENT)
Age: 50
End: 2020-11-30

## 2020-11-30 DIAGNOSIS — Z98.84 BARIATRIC SURGERY STATUS: Chronic | ICD-10-CM

## 2020-11-30 DIAGNOSIS — Z41.9 ENCOUNTER FOR PROCEDURE FOR PURPOSES OTHER THAN REMEDYING HEALTH STATE, UNSPECIFIED: Chronic | ICD-10-CM

## 2020-11-30 DIAGNOSIS — Z98.890 OTHER SPECIFIED POSTPROCEDURAL STATES: Chronic | ICD-10-CM

## 2020-11-30 PROCEDURE — 76641 ULTRASOUND BREAST COMPLETE: CPT | Mod: 26,50

## 2020-12-09 ENCOUNTER — APPOINTMENT (OUTPATIENT)
Dept: HEART AND VASCULAR | Facility: CLINIC | Age: 50
End: 2020-12-09
Payer: MEDICAID

## 2020-12-09 VITALS
OXYGEN SATURATION: 99 % | WEIGHT: 136 LBS | DIASTOLIC BLOOD PRESSURE: 80 MMHG | HEART RATE: 68 BPM | SYSTOLIC BLOOD PRESSURE: 120 MMHG | BODY MASS INDEX: 21.35 KG/M2 | HEIGHT: 67 IN

## 2020-12-09 DIAGNOSIS — Z03.818 ENCOUNTER FOR OBSERVATION FOR SUSPECTED EXPOSURE TO OTHER BIOLOGICAL AGENTS RULED OUT: ICD-10-CM

## 2020-12-09 PROCEDURE — 90471 IMMUNIZATION ADMIN: CPT

## 2020-12-09 PROCEDURE — 99072 ADDL SUPL MATRL&STAF TM PHE: CPT

## 2020-12-09 PROCEDURE — 90686 IIV4 VACC NO PRSV 0.5 ML IM: CPT

## 2020-12-09 PROCEDURE — 99212 OFFICE O/P EST SF 10 MIN: CPT | Mod: 25

## 2020-12-09 NOTE — HISTORY OF PRESENT ILLNESS
[FreeTextEntry1] : wants flu vax\par wamts covid testing\par wants to trty something for sleep, valium not helpful, still anxious

## 2020-12-22 ENCOUNTER — APPOINTMENT (OUTPATIENT)
Dept: SURGERY | Facility: CLINIC | Age: 50
End: 2020-12-22
Payer: MEDICAID

## 2020-12-22 VITALS
SYSTOLIC BLOOD PRESSURE: 123 MMHG | OXYGEN SATURATION: 99 % | TEMPERATURE: 97.4 F | HEIGHT: 67 IN | HEART RATE: 60 BPM | BODY MASS INDEX: 20.4 KG/M2 | WEIGHT: 130 LBS | DIASTOLIC BLOOD PRESSURE: 81 MMHG

## 2020-12-22 DIAGNOSIS — K86.89 OTHER SPECIFIED DISEASES OF PANCREAS: ICD-10-CM

## 2020-12-22 PROCEDURE — 99213 OFFICE O/P EST LOW 20 MIN: CPT

## 2020-12-22 PROCEDURE — 99072 ADDL SUPL MATRL&STAF TM PHE: CPT

## 2020-12-23 LAB
25(OH)D3 SERPL-MCNC: 15.5 NG/ML
ALBUMIN SERPL ELPH-MCNC: 4.2 G/DL
ALP BLD-CCNC: 122 U/L
ALT SERPL-CCNC: 10 U/L
ANION GAP SERPL CALC-SCNC: 11 MMOL/L
AST SERPL-CCNC: 14 U/L
BASOPHILS # BLD AUTO: 0.05 K/UL
BASOPHILS NFR BLD AUTO: 0.7 %
BILIRUB SERPL-MCNC: 0.3 MG/DL
BUN SERPL-MCNC: 9 MG/DL
CA-I SERPL-SCNC: 1.27 MMOL/L
CALCIUM SERPL-MCNC: 9.7 MG/DL
CALCIUM SERPL-MCNC: 9.7 MG/DL
CHLORIDE SERPL-SCNC: 105 MMOL/L
CHOLEST SERPL-MCNC: 156 MG/DL
CO2 SERPL-SCNC: 27 MMOL/L
CREAT SERPL-MCNC: 0.74 MG/DL
EOSINOPHIL # BLD AUTO: 0.05 K/UL
EOSINOPHIL NFR BLD AUTO: 0.7 %
ESTIMATED AVERAGE GLUCOSE: 105 MG/DL
FOLATE SERPL-MCNC: 6.3 NG/ML
GLUCOSE SERPL-MCNC: 92 MG/DL
HBA1C MFR BLD HPLC: 5.3 %
HCT VFR BLD CALC: 41.4 %
HDLC SERPL-MCNC: 66 MG/DL
HGB BLD-MCNC: 12.4 G/DL
IMM GRANULOCYTES NFR BLD AUTO: 0.3 %
INR PPP: 1.06 RATIO
IRON SATN MFR SERPL: 22 %
IRON SERPL-MCNC: 69 UG/DL
LDLC SERPL CALC-MCNC: 76 MG/DL
LYMPHOCYTES # BLD AUTO: 2.11 K/UL
LYMPHOCYTES NFR BLD AUTO: 30.3 %
MAN DIFF?: NORMAL
MCHC RBC-ENTMCNC: 28.1 PG
MCHC RBC-ENTMCNC: 30 GM/DL
MCV RBC AUTO: 93.7 FL
MONOCYTES # BLD AUTO: 0.38 K/UL
MONOCYTES NFR BLD AUTO: 5.5 %
NEUTROPHILS # BLD AUTO: 4.36 K/UL
NEUTROPHILS NFR BLD AUTO: 62.5 %
NONHDLC SERPL-MCNC: 91 MG/DL
PARATHYROID HORMONE INTACT: 63 PG/ML
PLATELET # BLD AUTO: 279 K/UL
POTASSIUM SERPL-SCNC: 4.5 MMOL/L
PREALB SERPL NEPH-MCNC: 19 MG/DL
PROT SERPL-MCNC: 7 G/DL
PT BLD: 12.5 SEC
RBC # BLD: 4.42 M/UL
RBC # FLD: 15.3 %
SODIUM SERPL-SCNC: 143 MMOL/L
TIBC SERPL-MCNC: 311 UG/DL
TRIGL SERPL-MCNC: 74 MG/DL
TSH SERPL-ACNC: 1.13 UIU/ML
UIBC SERPL-MCNC: 242 UG/DL
VIT B12 SERPL-MCNC: 425 PG/ML
WBC # FLD AUTO: 6.97 K/UL

## 2020-12-28 LAB
A-TOCOPHEROL VIT E SERPL-MCNC: 7.8 MG/L
BETA+GAMMA TOCOPHEROL SERPL-MCNC: 1.2 MG/L
VIT A SERPL-MCNC: 30.8 UG/DL

## 2020-12-28 NOTE — HISTORY OF PRESENT ILLNESS
[de-identified] : 48 y/o F presents for post op s/p hiatal hernia repair and Nissen fundoplication 11/14/19. Pt recent labs showed elevated liver enzymes, and was sent for US- showed mildly dilated pancreatic duct. Pt was sent for mrcp months ago but never went for this. Pt given new order today. Pt states she has not been eating much and sometimes only eats once per day. Expresses experiencing abdominal pain when eating and vomits a few times per week. States she is not exercising at home and has not been taking vitamins, all sent to pharmacy today. Explained to pt the importance of following diet guidelines and recommendations. Pt states she does not want to lose any more wt, expressed the importance of high protein diet and strength training exercise to maintain her wt. Pt to go for labs, UGI, MRCP, nutrition.

## 2021-01-06 ENCOUNTER — TRANSCRIPTION ENCOUNTER (OUTPATIENT)
Age: 51
End: 2021-01-06

## 2021-01-07 ENCOUNTER — TRANSCRIPTION ENCOUNTER (OUTPATIENT)
Age: 51
End: 2021-01-07

## 2021-01-07 LAB
VIT B1 SERPL-MCNC: 89.8 NMOL/L
ZINC SERPL-MCNC: 93 UG/DL

## 2021-01-26 ENCOUNTER — APPOINTMENT (OUTPATIENT)
Dept: HEART AND VASCULAR | Facility: CLINIC | Age: 51
End: 2021-01-26
Payer: MEDICAID

## 2021-01-26 VITALS
BODY MASS INDEX: 20.4 KG/M2 | HEART RATE: 55 BPM | WEIGHT: 130 LBS | OXYGEN SATURATION: 98 % | HEIGHT: 67 IN | SYSTOLIC BLOOD PRESSURE: 120 MMHG | DIASTOLIC BLOOD PRESSURE: 70 MMHG

## 2021-01-26 PROCEDURE — 99072 ADDL SUPL MATRL&STAF TM PHE: CPT

## 2021-01-26 PROCEDURE — 99214 OFFICE O/P EST MOD 30 MIN: CPT

## 2021-01-26 RX ORDER — ZOLPIDEM TARTRATE 10 MG/1
10 TABLET ORAL
Qty: 30 | Refills: 0 | Status: DISCONTINUED | COMMUNITY
Start: 2020-12-09 | End: 2021-01-26

## 2021-01-26 NOTE — PHYSICAL EXAM
[General Appearance - Well Developed] : well developed [Normal Appearance] : normal appearance [Well Groomed] : well groomed [General Appearance - Well Nourished] : well nourished [No Deformities] : no deformities [General Appearance - In No Acute Distress] : no acute distress [Normal Conjunctiva] : the conjunctiva exhibited no abnormalities [Eyelids - No Xanthelasma] : the eyelids demonstrated no xanthelasmas [Normal Oral Mucosa] : normal oral mucosa [No Oral Pallor] : no oral pallor [No Oral Cyanosis] : no oral cyanosis [Normal Jugular Venous A Waves Present] : normal jugular venous A waves present [Normal Jugular Venous V Waves Present] : normal jugular venous V waves present [No Jugular Venous Gates A Waves] : no jugular venous gates A waves [Respiration, Rhythm And Depth] : normal respiratory rhythm and effort [Auscultation Breath Sounds / Voice Sounds] : lungs were clear to auscultation bilaterally [Exaggerated Use Of Accessory Muscles For Inspiration] : no accessory muscle use [Heart Rate And Rhythm] : heart rate and rhythm were normal [Heart Sounds] : normal S1 and S2 [Abdomen Soft] : soft [Abdomen Tenderness] : non-tender [Abdomen Mass (___ Cm)] : no abdominal mass palpated [Abnormal Walk] : normal gait [Gait - Sufficient For Exercise Testing] : the gait was sufficient for exercise testing [Nail Clubbing] : no clubbing of the fingernails [Cyanosis, Localized] : no localized cyanosis [Petechial Hemorrhages (___cm)] : no petechial hemorrhages [] : no ischemic changes [Oriented To Time, Place, And Person] : oriented to person, place, and time [Affect] : the affect was normal [Mood] : the mood was normal [No Anxiety] : not feeling anxious

## 2021-02-23 ENCOUNTER — APPOINTMENT (OUTPATIENT)
Dept: HEART AND VASCULAR | Facility: CLINIC | Age: 51
End: 2021-02-23
Payer: MEDICAID

## 2021-02-23 VITALS
SYSTOLIC BLOOD PRESSURE: 110 MMHG | HEIGHT: 67 IN | TEMPERATURE: 97 F | HEART RATE: 57 BPM | DIASTOLIC BLOOD PRESSURE: 70 MMHG | OXYGEN SATURATION: 99 % | WEIGHT: 130 LBS | BODY MASS INDEX: 20.4 KG/M2

## 2021-02-23 DIAGNOSIS — F41.9 ANXIETY DISORDER, UNSPECIFIED: ICD-10-CM

## 2021-02-23 DIAGNOSIS — G47.00 INSOMNIA, UNSPECIFIED: ICD-10-CM

## 2021-02-23 PROCEDURE — 99072 ADDL SUPL MATRL&STAF TM PHE: CPT

## 2021-02-23 PROCEDURE — 99213 OFFICE O/P EST LOW 20 MIN: CPT

## 2021-02-23 NOTE — DISCUSSION/SUMMARY
[FreeTextEntry1] : anxiety/insomnia- some improvement, discussed and recommended need for therapist and behavior modification, psychiatrist for medication adjustments

## 2021-02-23 NOTE — PHYSICAL EXAM
[General Appearance - Well Developed] : well developed [Normal Appearance] : normal appearance [Well Groomed] : well groomed [General Appearance - Well Nourished] : well nourished [No Deformities] : no deformities [General Appearance - In No Acute Distress] : no acute distress [Normal Conjunctiva] : the conjunctiva exhibited no abnormalities [Eyelids - No Xanthelasma] : the eyelids demonstrated no xanthelasmas [Normal Oral Mucosa] : normal oral mucosa [No Oral Pallor] : no oral pallor [No Oral Cyanosis] : no oral cyanosis [Normal Jugular Venous A Waves Present] : normal jugular venous A waves present [Normal Jugular Venous V Waves Present] : normal jugular venous V waves present [No Jugular Venous Gates A Waves] : no jugular venous gates A waves [Respiration, Rhythm And Depth] : normal respiratory rhythm and effort [Exaggerated Use Of Accessory Muscles For Inspiration] : no accessory muscle use [Auscultation Breath Sounds / Voice Sounds] : lungs were clear to auscultation bilaterally [Heart Rate And Rhythm] : heart rate and rhythm were normal [Heart Sounds] : normal S1 and S2 [Abdomen Soft] : soft [Abdomen Tenderness] : non-tender [Abdomen Mass (___ Cm)] : no abdominal mass palpated [Abnormal Walk] : normal gait [Gait - Sufficient For Exercise Testing] : the gait was sufficient for exercise testing [Nail Clubbing] : no clubbing of the fingernails [Cyanosis, Localized] : no localized cyanosis [Petechial Hemorrhages (___cm)] : no petechial hemorrhages [] : no ischemic changes [Oriented To Time, Place, And Person] : oriented to person, place, and time [Mood] : the mood was normal [Affect] : the affect was normal [No Anxiety] : not feeling anxious

## 2021-02-25 ENCOUNTER — APPOINTMENT (OUTPATIENT)
Dept: OBGYN | Facility: CLINIC | Age: 51
End: 2021-02-25

## 2021-02-25 ENCOUNTER — RESULT REVIEW (OUTPATIENT)
Age: 51
End: 2021-02-25

## 2021-03-05 ENCOUNTER — OUTPATIENT (OUTPATIENT)
Dept: OUTPATIENT SERVICES | Facility: HOSPITAL | Age: 51
LOS: 1 days | End: 2021-03-05
Payer: COMMERCIAL

## 2021-03-05 ENCOUNTER — RESULT REVIEW (OUTPATIENT)
Age: 51
End: 2021-03-05

## 2021-03-05 ENCOUNTER — APPOINTMENT (OUTPATIENT)
Dept: MRI IMAGING | Facility: HOSPITAL | Age: 51
End: 2021-03-05
Payer: MEDICAID

## 2021-03-05 ENCOUNTER — APPOINTMENT (OUTPATIENT)
Dept: RADIOLOGY | Facility: HOSPITAL | Age: 51
End: 2021-03-05
Payer: MEDICAID

## 2021-03-05 DIAGNOSIS — Z98.84 BARIATRIC SURGERY STATUS: Chronic | ICD-10-CM

## 2021-03-05 DIAGNOSIS — Z41.9 ENCOUNTER FOR PROCEDURE FOR PURPOSES OTHER THAN REMEDYING HEALTH STATE, UNSPECIFIED: Chronic | ICD-10-CM

## 2021-03-05 DIAGNOSIS — Z98.890 OTHER SPECIFIED POSTPROCEDURAL STATES: Chronic | ICD-10-CM

## 2021-03-05 PROCEDURE — 74240 X-RAY XM UPR GI TRC 1CNTRST: CPT | Mod: 26

## 2021-03-05 PROCEDURE — 74240 X-RAY XM UPR GI TRC 1CNTRST: CPT

## 2021-03-11 ENCOUNTER — NON-APPOINTMENT (OUTPATIENT)
Age: 51
End: 2021-03-11

## 2021-04-06 ENCOUNTER — APPOINTMENT (OUTPATIENT)
Dept: SURGERY | Facility: CLINIC | Age: 51
End: 2021-04-06
Payer: MEDICAID

## 2021-04-06 VITALS
WEIGHT: 123 LBS | SYSTOLIC BLOOD PRESSURE: 96 MMHG | DIASTOLIC BLOOD PRESSURE: 57 MMHG | BODY MASS INDEX: 19.3 KG/M2 | HEIGHT: 67 IN | TEMPERATURE: 97.7 F | HEART RATE: 55 BPM | OXYGEN SATURATION: 97 %

## 2021-04-06 DIAGNOSIS — R11.2 NAUSEA WITH VOMITING, UNSPECIFIED: ICD-10-CM

## 2021-04-06 PROCEDURE — 99072 ADDL SUPL MATRL&STAF TM PHE: CPT

## 2021-04-06 PROCEDURE — 99212 OFFICE O/P EST SF 10 MIN: CPT

## 2021-04-06 NOTE — HISTORY OF PRESENT ILLNESS
[de-identified] : Pt is a 51 y/o F presents for post op s/p hiatal hernia repair and Nissen fundoplication 11/14/19, s/p RYGB in 2014 at [facility] by  who presents today. Pt recent labs showed elevated liver enzymes, and was sent for US- showed mildly dilated pancreatic duct. Pt was sent for MRCP several times but states she recently went to do the MRCP but wasn't able to because of claustrophobia. Pt reports concern because she continues to lose weight and has been vomiting after eating, tolerating liquids better. She reports that some days she is able to keep down food but others, she throws up everything consumed. She reports BM once every 3 days but states she's always had constipation issues. She endorses daily GERD symptoms, last EGD 2019, demeester score of 50+, path nl. Reports taking Omeprazole.

## 2021-04-06 NOTE — END OF VISIT
[FreeTextEntry3] : All medical record entries made by the Scribe were at my, VERÓNICA Cooley, discretion and personally dictated by me on 04/06/2021 . I have reviewed the chart and agree that the record accurately reflects my personal performance of the history, physical exam, assessment and plan. I have also personally directed, reviewed and agreed to the chart.

## 2021-04-06 NOTE — ADDENDUM
[FreeTextEntry1] : This note was written by Sherry Rojas on 04/06/2021 acting as scribe for VERÓNICA Cooley.

## 2021-04-06 NOTE — PHYSICAL EXAM
[Thin] : thin [Normal] : grossly intact [Vesicles] : no vesicles [de-identified] : distressed, anxious

## 2021-04-06 NOTE — ASSESSMENT
[FreeTextEntry1] : Pt is a 51 y/o F presents for post op s/p hiatal hernia repair and Nissen fundoplication 11/14/19. Recent blood work showed elevated liver enzymes and pt was sent to MRCP but did not do it because of claustrophobia. We reviewed pt's recent UGI series 3/5/21 which demonstrates reflux. Pt c/o frequent vomiting and continual weight loss. We discussed the importance of consuming sufficient calories. I told pt to have at least 3 proteins shakes a day as well as yogurt, soups and other soft foods she can tolerate. I ordered blood work and a repeat EGD for further investigation. I informed pt that I am unsure if her hiatal hernia is causing her symptoms because the severity of the symptoms does not match the severity of the hernia. I informed pt that if her EGD is unremarkable other than hiatal hernia, I can do a hiatal hernia repair to see if this improves symptoms.\par \par \par \par \par

## 2021-04-07 ENCOUNTER — APPOINTMENT (OUTPATIENT)
Dept: HEART AND VASCULAR | Facility: CLINIC | Age: 51
End: 2021-04-07

## 2021-06-26 ENCOUNTER — LABORATORY RESULT (OUTPATIENT)
Age: 51
End: 2021-06-26

## 2021-06-28 ENCOUNTER — OUTPATIENT (OUTPATIENT)
Dept: OUTPATIENT SERVICES | Facility: HOSPITAL | Age: 51
LOS: 1 days | Discharge: HOME | End: 2021-06-28
Payer: COMMERCIAL

## 2021-06-28 ENCOUNTER — RESULT REVIEW (OUTPATIENT)
Age: 51
End: 2021-06-28

## 2021-06-28 ENCOUNTER — TRANSCRIPTION ENCOUNTER (OUTPATIENT)
Age: 51
End: 2021-06-28

## 2021-06-28 DIAGNOSIS — Z98.84 BARIATRIC SURGERY STATUS: Chronic | ICD-10-CM

## 2021-06-28 DIAGNOSIS — Z98.890 OTHER SPECIFIED POSTPROCEDURAL STATES: Chronic | ICD-10-CM

## 2021-06-28 DIAGNOSIS — Z41.9 ENCOUNTER FOR PROCEDURE FOR PURPOSES OTHER THAN REMEDYING HEALTH STATE, UNSPECIFIED: Chronic | ICD-10-CM

## 2021-06-28 LAB — GLUCOSE BLDC GLUCOMTR-MCNC: 90 MG/DL — SIGNIFICANT CHANGE UP (ref 70–99)

## 2021-06-28 PROCEDURE — 43239 EGD BIOPSY SINGLE/MULTIPLE: CPT

## 2021-06-28 PROCEDURE — 91035 G-ESOPH REFLX TST W/ELECTROD: CPT | Mod: 26

## 2021-06-28 PROCEDURE — 88305 TISSUE EXAM BY PATHOLOGIST: CPT | Mod: 26

## 2021-06-28 PROCEDURE — 88305 TISSUE EXAM BY PATHOLOGIST: CPT

## 2021-06-28 PROCEDURE — C1889: CPT

## 2021-06-28 PROCEDURE — 82962 GLUCOSE BLOOD TEST: CPT

## 2021-06-29 LAB — SURGICAL PATHOLOGY STUDY: SIGNIFICANT CHANGE UP

## 2021-08-03 ENCOUNTER — TRANSCRIPTION ENCOUNTER (OUTPATIENT)
Age: 51
End: 2021-08-03

## 2021-09-15 ENCOUNTER — APPOINTMENT (OUTPATIENT)
Dept: HEART AND VASCULAR | Facility: CLINIC | Age: 51
End: 2021-09-15

## 2021-09-16 ENCOUNTER — OUTPATIENT (OUTPATIENT)
Dept: OUTPATIENT SERVICES | Facility: HOSPITAL | Age: 51
LOS: 1 days | End: 2021-09-16
Payer: COMMERCIAL

## 2021-09-16 ENCOUNTER — APPOINTMENT (OUTPATIENT)
Dept: MRI IMAGING | Facility: HOSPITAL | Age: 51
End: 2021-09-16

## 2021-09-16 DIAGNOSIS — Z41.9 ENCOUNTER FOR PROCEDURE FOR PURPOSES OTHER THAN REMEDYING HEALTH STATE, UNSPECIFIED: Chronic | ICD-10-CM

## 2021-09-16 DIAGNOSIS — Z98.890 OTHER SPECIFIED POSTPROCEDURAL STATES: Chronic | ICD-10-CM

## 2021-09-16 DIAGNOSIS — Z98.84 BARIATRIC SURGERY STATUS: Chronic | ICD-10-CM

## 2021-09-16 PROCEDURE — 74183 MRI ABD W/O CNTR FLWD CNTR: CPT | Mod: 26

## 2021-09-16 PROCEDURE — A9585: CPT

## 2021-09-16 PROCEDURE — 74183 MRI ABD W/O CNTR FLWD CNTR: CPT

## 2021-09-23 ENCOUNTER — APPOINTMENT (OUTPATIENT)
Dept: HEART AND VASCULAR | Facility: CLINIC | Age: 51
End: 2021-09-23
Payer: MEDICAID

## 2021-09-23 VITALS
BODY MASS INDEX: 23.23 KG/M2 | TEMPERATURE: 96.7 F | HEIGHT: 67 IN | RESPIRATION RATE: 14 BRPM | OXYGEN SATURATION: 96 % | SYSTOLIC BLOOD PRESSURE: 100 MMHG | DIASTOLIC BLOOD PRESSURE: 60 MMHG | WEIGHT: 148 LBS | HEART RATE: 86 BPM

## 2021-09-23 DIAGNOSIS — M54.5 LOW BACK PAIN: ICD-10-CM

## 2021-09-23 DIAGNOSIS — M25.569 PAIN IN UNSPECIFIED KNEE: ICD-10-CM

## 2021-09-23 DIAGNOSIS — H00.019 HORDEOLUM EXTERNUM UNSPECIFIED EYE, UNSPECIFIED EYELID: ICD-10-CM

## 2021-09-23 DIAGNOSIS — R60.0 LOCALIZED EDEMA: ICD-10-CM

## 2021-09-23 PROCEDURE — 36415 COLL VENOUS BLD VENIPUNCTURE: CPT

## 2021-09-23 PROCEDURE — 99214 OFFICE O/P EST MOD 30 MIN: CPT

## 2021-09-23 RX ORDER — OMEPRAZOLE 40 MG/1
40 CAPSULE, DELAYED RELEASE ORAL
Qty: 30 | Refills: 0 | Status: DISCONTINUED | COMMUNITY
Start: 2020-12-22 | End: 2021-09-23

## 2021-09-23 NOTE — DISCUSSION/SUMMARY
[FreeTextEntry1] : sty- warm compresses/erythromycin\par thyroid check tfts\par knee pain ortho\par edema- wants to see vascular\par back pain- pain management

## 2021-09-23 NOTE — PHYSICAL EXAM
[Well Developed] : well developed [Well Nourished] : well nourished [No Acute Distress] : no acute distress [Normal Conjunctiva] : normal conjunctiva [Normal Venous Pressure] : normal venous pressure [No Carotid Bruit] : no carotid bruit [Normal S1, S2] : normal S1, S2 [No Murmur] : no murmur [No Rub] : no rub [No Gallop] : no gallop [Clear Lung Fields] : clear lung fields [Good Air Entry] : good air entry [No Respiratory Distress] : no respiratory distress  [Soft] : abdomen soft [Non Tender] : non-tender [No Masses/organomegaly] : no masses/organomegaly [Normal Bowel Sounds] : normal bowel sounds [Normal Gait] : normal gait [No Edema] : no edema [No Cyanosis] : no cyanosis [No Clubbing] : no clubbing [No Rash] : no rash [Moves all extremities] : moves all extremities [No Focal Deficits] : no focal deficits [Normal Speech] : normal speech [Alert and Oriented] : alert and oriented [Normal memory] : normal memory [de-identified] : taylor

## 2021-09-24 LAB
ALBUMIN SERPL ELPH-MCNC: 4.3 G/DL
ALP BLD-CCNC: 114 U/L
ALT SERPL-CCNC: 10 U/L
ANION GAP SERPL CALC-SCNC: 12 MMOL/L
AST SERPL-CCNC: 21 U/L
BASOPHILS # BLD AUTO: 0.05 K/UL
BASOPHILS NFR BLD AUTO: 0.8 %
BILIRUB SERPL-MCNC: 0.2 MG/DL
BUN SERPL-MCNC: 10 MG/DL
CALCIUM SERPL-MCNC: 9.3 MG/DL
CHLORIDE SERPL-SCNC: 105 MMOL/L
CHOLEST SERPL-MCNC: 143 MG/DL
CO2 SERPL-SCNC: 26 MMOL/L
CREAT SERPL-MCNC: 0.69 MG/DL
EOSINOPHIL # BLD AUTO: 0.23 K/UL
EOSINOPHIL NFR BLD AUTO: 3.9 %
ESTIMATED AVERAGE GLUCOSE: 117 MG/DL
GLUCOSE SERPL-MCNC: 114 MG/DL
HBA1C MFR BLD HPLC: 5.7 %
HCT VFR BLD CALC: 35.9 %
HDLC SERPL-MCNC: 59 MG/DL
HGB BLD-MCNC: 11.3 G/DL
IMM GRANULOCYTES NFR BLD AUTO: 0.2 %
LDLC SERPL CALC-MCNC: 72 MG/DL
LYMPHOCYTES # BLD AUTO: 1.84 K/UL
LYMPHOCYTES NFR BLD AUTO: 31.2 %
MAN DIFF?: NORMAL
MCHC RBC-ENTMCNC: 28 PG
MCHC RBC-ENTMCNC: 31.5 GM/DL
MCV RBC AUTO: 88.9 FL
MONOCYTES # BLD AUTO: 0.39 K/UL
MONOCYTES NFR BLD AUTO: 6.6 %
NEUTROPHILS # BLD AUTO: 3.37 K/UL
NEUTROPHILS NFR BLD AUTO: 57.3 %
NONHDLC SERPL-MCNC: 84 MG/DL
PLATELET # BLD AUTO: 215 K/UL
POTASSIUM SERPL-SCNC: 4.2 MMOL/L
PROT SERPL-MCNC: 7 G/DL
RBC # BLD: 4.04 M/UL
RBC # FLD: 15.3 %
SODIUM SERPL-SCNC: 142 MMOL/L
T4 FREE SERPL-MCNC: 0.6 NG/DL
TRIGL SERPL-MCNC: 62 MG/DL
TSH SERPL-ACNC: 41.2 UIU/ML
WBC # FLD AUTO: 5.89 K/UL

## 2021-09-28 ENCOUNTER — APPOINTMENT (OUTPATIENT)
Dept: SURGERY | Facility: CLINIC | Age: 51
End: 2021-09-28
Payer: MEDICAID

## 2021-09-28 VITALS
BODY MASS INDEX: 23.23 KG/M2 | SYSTOLIC BLOOD PRESSURE: 129 MMHG | HEART RATE: 65 BPM | WEIGHT: 148 LBS | TEMPERATURE: 97.4 F | OXYGEN SATURATION: 98 % | HEIGHT: 67 IN | DIASTOLIC BLOOD PRESSURE: 91 MMHG

## 2021-09-28 PROCEDURE — 99213 OFFICE O/P EST LOW 20 MIN: CPT

## 2021-09-28 NOTE — ADDENDUM
[FreeTextEntry1] : This note was written by Sherry Rojas on 09/28/2021 acting as scribe for Dr. Anne

## 2021-09-28 NOTE — ASSESSMENT
[FreeTextEntry1] : Pt is a 51 y/o F s/p hiatal hernia repair and Nissen fundoplication 11/14/19, s/p RYGB in 2014 at [facility] who presents for follow up with c/o dysphagia, food sticking, difficulty eating. I recommend a hiatal hernia repair.  The risks, benefits, and alternatives to the proposed procedure were discussed with the patient and all questions were answered to their satisfaction. Will schedule hiatal hernia repair and obtain necessary medical clearance. \par \par \par

## 2021-09-28 NOTE — END OF VISIT
[FreeTextEntry3] : All medical record entries made by the Scribe were at my, Dr. Anne's, discretion and personally dictated by me on 09/28/2021. I have reviewed the chart and agree that the record accurately reflects my personal performance of the history, physical exam, assessment and plan. I have also personally directed, reviewed and agreed to the chart.

## 2021-09-28 NOTE — HISTORY OF PRESENT ILLNESS
[de-identified] : Pt is a 51 y/o F s/p hiatal hernia repair and Nissen fundoplication 11/14/19, s/p RYGB in 2014 at [facility] by Dr. Hugo who presents today for follow up. Pt is doing generally well but c/o difficulty eating and food sticking. Pt states her inability to eat comfortably has interfered with her daily life. At her last visit, she was advised to increase food and protein intake. Weight was 123 lb in April but is now 148 lb.\par \par

## 2021-10-01 ENCOUNTER — APPOINTMENT (OUTPATIENT)
Dept: HEART AND VASCULAR | Facility: CLINIC | Age: 51
End: 2021-10-01

## 2021-10-15 ENCOUNTER — APPOINTMENT (OUTPATIENT)
Dept: HEART AND VASCULAR | Facility: CLINIC | Age: 51
End: 2021-10-15
Payer: MEDICAID

## 2021-10-15 VITALS
RESPIRATION RATE: 14 BRPM | OXYGEN SATURATION: 98 % | HEART RATE: 60 BPM | SYSTOLIC BLOOD PRESSURE: 122 MMHG | HEIGHT: 67 IN | TEMPERATURE: 98 F | DIASTOLIC BLOOD PRESSURE: 70 MMHG

## 2021-10-15 VITALS — BODY MASS INDEX: 22.4 KG/M2 | WEIGHT: 143 LBS

## 2021-10-15 DIAGNOSIS — K44.9 DIAPHRAGMATIC HERNIA W/OUT OBSTRUCTION OR GANGRENE: ICD-10-CM

## 2021-10-15 DIAGNOSIS — Z01.818 ENCOUNTER FOR OTHER PREPROCEDURAL EXAMINATION: ICD-10-CM

## 2021-10-15 DIAGNOSIS — K21.9 DIAPHRAGMATIC HERNIA W/OUT OBSTRUCTION OR GANGRENE: ICD-10-CM

## 2021-10-15 PROCEDURE — 93000 ELECTROCARDIOGRAM COMPLETE: CPT | Mod: NC

## 2021-10-15 PROCEDURE — 99214 OFFICE O/P EST MOD 30 MIN: CPT

## 2021-10-15 PROCEDURE — 36415 COLL VENOUS BLD VENIPUNCTURE: CPT

## 2021-10-15 NOTE — PHYSICAL EXAM
[Well Developed] : well developed [Well Nourished] : well nourished [No Acute Distress] : no acute distress [Normal Conjunctiva] : normal conjunctiva [Normal Venous Pressure] : normal venous pressure [No Carotid Bruit] : no carotid bruit [Normal S1, S2] : normal S1, S2 [No Murmur] : no murmur [No Rub] : no rub [No Gallop] : no gallop [Clear Lung Fields] : clear lung fields [Good Air Entry] : good air entry [No Respiratory Distress] : no respiratory distress  [Soft] : abdomen soft [Non Tender] : non-tender [No Masses/organomegaly] : no masses/organomegaly [Normal Bowel Sounds] : normal bowel sounds [Normal Gait] : normal gait [No Edema] : no edema [No Cyanosis] : no cyanosis [No Clubbing] : no clubbing [No Rash] : no rash [Moves all extremities] : moves all extremities [No Focal Deficits] : no focal deficits [Normal Speech] : normal speech [Alert and Oriented] : alert and oriented [Normal memory] : normal memory [de-identified] : taylor

## 2021-10-15 NOTE — HISTORY OF PRESENT ILLNESS
[FreeTextEntry1] : for evaluation prior to hiatal hernia repair\par good functional capacity\par thyroid on replacement\par no h/o bleeding tendencies\par

## 2021-10-18 LAB
ALBUMIN SERPL ELPH-MCNC: 4.2 G/DL
ALP BLD-CCNC: 113 U/L
ALT SERPL-CCNC: 7 U/L
ANION GAP SERPL CALC-SCNC: 10 MMOL/L
APPEARANCE: CLEAR
APTT BLD: 40.1 SEC
AST SERPL-CCNC: 14 U/L
BASOPHILS # BLD AUTO: 0.03 K/UL
BASOPHILS NFR BLD AUTO: 0.5 %
BILIRUB SERPL-MCNC: 0.4 MG/DL
BILIRUBIN URINE: NEGATIVE
BLOOD URINE: NEGATIVE
BUN SERPL-MCNC: 8 MG/DL
CALCIUM SERPL-MCNC: 9.4 MG/DL
CHLORIDE SERPL-SCNC: 106 MMOL/L
CO2 SERPL-SCNC: 25 MMOL/L
COLOR: NORMAL
CREAT SERPL-MCNC: 0.6 MG/DL
EOSINOPHIL # BLD AUTO: 0.05 K/UL
EOSINOPHIL NFR BLD AUTO: 0.9 %
GLUCOSE QUALITATIVE U: NEGATIVE
GLUCOSE SERPL-MCNC: 93 MG/DL
HCG SERPL-MCNC: <1 MIU/ML
HCT VFR BLD CALC: 38.5 %
HGB BLD-MCNC: 11.6 G/DL
IMM GRANULOCYTES NFR BLD AUTO: 0.2 %
INR PPP: 1.06 RATIO
KETONES URINE: NEGATIVE
LEUKOCYTE ESTERASE URINE: NEGATIVE
LYMPHOCYTES # BLD AUTO: 1.3 K/UL
LYMPHOCYTES NFR BLD AUTO: 23.8 %
MAN DIFF?: NORMAL
MCHC RBC-ENTMCNC: 27.6 PG
MCHC RBC-ENTMCNC: 30.1 GM/DL
MCV RBC AUTO: 91.4 FL
MONOCYTES # BLD AUTO: 0.31 K/UL
MONOCYTES NFR BLD AUTO: 5.7 %
NEUTROPHILS # BLD AUTO: 3.77 K/UL
NEUTROPHILS NFR BLD AUTO: 68.9 %
NITRITE URINE: NEGATIVE
PH URINE: 8
PLATELET # BLD AUTO: 210 K/UL
POTASSIUM SERPL-SCNC: 4.5 MMOL/L
PROT SERPL-MCNC: 6.9 G/DL
PROTEIN URINE: NEGATIVE
PT BLD: 12.5 SEC
RBC # BLD: 4.21 M/UL
RBC # FLD: 15.6 %
SODIUM SERPL-SCNC: 141 MMOL/L
SPECIFIC GRAVITY URINE: 1.01
TSH SERPL-ACNC: 6.18 UIU/ML
UROBILINOGEN URINE: NORMAL
WBC # FLD AUTO: 5.47 K/UL

## 2021-10-27 ENCOUNTER — OUTPATIENT (OUTPATIENT)
Dept: OUTPATIENT SERVICES | Facility: HOSPITAL | Age: 51
LOS: 1 days | End: 2021-10-27
Payer: COMMERCIAL

## 2021-10-27 DIAGNOSIS — Z98.890 OTHER SPECIFIED POSTPROCEDURAL STATES: Chronic | ICD-10-CM

## 2021-10-27 DIAGNOSIS — Z41.9 ENCOUNTER FOR PROCEDURE FOR PURPOSES OTHER THAN REMEDYING HEALTH STATE, UNSPECIFIED: Chronic | ICD-10-CM

## 2021-10-27 DIAGNOSIS — Z98.84 BARIATRIC SURGERY STATUS: Chronic | ICD-10-CM

## 2021-10-27 PROCEDURE — 71046 X-RAY EXAM CHEST 2 VIEWS: CPT | Mod: 26

## 2021-10-27 PROCEDURE — 71046 X-RAY EXAM CHEST 2 VIEWS: CPT

## 2021-11-08 ENCOUNTER — LABORATORY RESULT (OUTPATIENT)
Age: 51
End: 2021-11-08

## 2021-11-10 NOTE — ASU PATIENT PROFILE, ADULT - NSSUBSTANCEUSE_GEN_ALL_CORE_SD
[Postmenopausal] : The patient is postmenopausal [Menarche Age ____] : age at menarche was [unfilled] [Approximately ___] : the LMP was approximately [unfilled] [Total Preg ___] : G[unfilled] [Live Births ___] : P[unfilled]  [Abortions ___] : Abortions:[unfilled] [Age At Live Birth ___] : Age at live birth: [unfilled] [History of Hormone Replacement Treatment] : has no history of hormone replacement treatment never used

## 2021-11-10 NOTE — ASU PATIENT PROFILE, ADULT - NSICDXPASTSURGICALHX_GEN_ALL_CORE_FT
PAST SURGICAL HISTORY:  Elective surgery left wrist    H/O arthroscopy of right knee     H/O gastric bypass 2014 at [facility]

## 2021-11-10 NOTE — ASU PATIENT PROFILE, ADULT - NSICDXPASTMEDICALHX_GEN_ALL_CORE_FT
PAST MEDICAL HISTORY:  Back pain     Bilateral edema of lower extremity     Depression     GERD (gastroesophageal reflux disease) chronic    H/O insomnia     Helicobacter pylori (H. pylori) infection     Hiatal hernia with GERD     History of prediabetes     Hypothyroid     Morbid obesity     Pain, knee     Palpitation     Varicose veins

## 2021-11-11 ENCOUNTER — APPOINTMENT (OUTPATIENT)
Dept: SURGERY | Facility: HOSPITAL | Age: 51
End: 2021-11-11

## 2021-11-11 ENCOUNTER — INPATIENT (INPATIENT)
Facility: HOSPITAL | Age: 51
LOS: 1 days | Discharge: HOME | DRG: 328 | End: 2021-11-13
Attending: SURGERY | Admitting: SURGERY
Payer: COMMERCIAL

## 2021-11-11 ENCOUNTER — TRANSCRIPTION ENCOUNTER (OUTPATIENT)
Age: 51
End: 2021-11-11

## 2021-11-11 VITALS
OXYGEN SATURATION: 98 % | SYSTOLIC BLOOD PRESSURE: 108 MMHG | DIASTOLIC BLOOD PRESSURE: 73 MMHG | WEIGHT: 151.9 LBS | TEMPERATURE: 98 F | HEIGHT: 69 IN | HEART RATE: 57 BPM | RESPIRATION RATE: 16 BRPM

## 2021-11-11 DIAGNOSIS — Z98.84 BARIATRIC SURGERY STATUS: Chronic | ICD-10-CM

## 2021-11-11 DIAGNOSIS — K44.9 DIAPHRAGMATIC HERNIA WITHOUT OBSTRUCTION OR GANGRENE: ICD-10-CM

## 2021-11-11 DIAGNOSIS — Z41.9 ENCOUNTER FOR PROCEDURE FOR PURPOSES OTHER THAN REMEDYING HEALTH STATE, UNSPECIFIED: Chronic | ICD-10-CM

## 2021-11-11 DIAGNOSIS — Z98.890 OTHER SPECIFIED POSTPROCEDURAL STATES: Chronic | ICD-10-CM

## 2021-11-11 LAB
BLD GP AB SCN SERPL QL: NEGATIVE — SIGNIFICANT CHANGE UP
HCG SERPL-ACNC: <0 MIU/ML — SIGNIFICANT CHANGE UP
HCT VFR BLD CALC: 35.5 % — SIGNIFICANT CHANGE UP (ref 34.5–45)
HGB BLD-MCNC: 11.1 G/DL — LOW (ref 11.5–15.5)
MCHC RBC-ENTMCNC: 29.1 PG — SIGNIFICANT CHANGE UP (ref 27–34)
MCHC RBC-ENTMCNC: 31.3 GM/DL — LOW (ref 32–36)
MCV RBC AUTO: 92.9 FL — SIGNIFICANT CHANGE UP (ref 80–100)
NRBC # BLD: 0 /100 WBCS — SIGNIFICANT CHANGE UP (ref 0–0)
PLATELET # BLD AUTO: 178 K/UL — SIGNIFICANT CHANGE UP (ref 150–400)
RBC # BLD: 3.82 M/UL — SIGNIFICANT CHANGE UP (ref 3.8–5.2)
RBC # FLD: 14.5 % — SIGNIFICANT CHANGE UP (ref 10.3–14.5)
RH IG SCN BLD-IMP: POSITIVE — SIGNIFICANT CHANGE UP
WBC # BLD: 8.01 K/UL — SIGNIFICANT CHANGE UP (ref 3.8–10.5)
WBC # FLD AUTO: 8.01 K/UL — SIGNIFICANT CHANGE UP (ref 3.8–10.5)

## 2021-11-11 PROCEDURE — 43281 LAP PARAESOPHAG HERN REPAIR: CPT | Mod: GC

## 2021-11-11 PROCEDURE — S2900 ROBOTIC SURGICAL SYSTEM: CPT | Mod: NC

## 2021-11-11 RX ORDER — SODIUM CHLORIDE 9 MG/ML
1000 INJECTION, SOLUTION INTRAVENOUS
Refills: 0 | Status: DISCONTINUED | OUTPATIENT
Start: 2021-11-11 | End: 2021-11-12

## 2021-11-11 RX ORDER — ONDANSETRON 8 MG/1
4 TABLET, FILM COATED ORAL ONCE
Refills: 0 | Status: DISCONTINUED | OUTPATIENT
Start: 2021-11-11 | End: 2021-11-13

## 2021-11-11 RX ORDER — MAGNESIUM CHLORIDE
0 CRYSTALS MISCELLANEOUS
Qty: 0 | Refills: 0 | DISCHARGE

## 2021-11-11 RX ORDER — ENOXAPARIN SODIUM 100 MG/ML
40 INJECTION SUBCUTANEOUS ONCE
Refills: 0 | Status: COMPLETED | OUTPATIENT
Start: 2021-11-11 | End: 2021-11-11

## 2021-11-11 RX ORDER — GABAPENTIN 400 MG/1
300 CAPSULE ORAL ONCE
Refills: 0 | Status: COMPLETED | OUTPATIENT
Start: 2021-11-11 | End: 2021-11-11

## 2021-11-11 RX ORDER — SCOPALAMINE 1 MG/3D
1 PATCH, EXTENDED RELEASE TRANSDERMAL ONCE
Refills: 0 | Status: COMPLETED | OUTPATIENT
Start: 2021-11-11 | End: 2021-11-11

## 2021-11-11 RX ORDER — ACETAMINOPHEN 500 MG
1000 TABLET ORAL ONCE
Refills: 0 | Status: COMPLETED | OUTPATIENT
Start: 2021-11-11 | End: 2021-11-12

## 2021-11-11 RX ORDER — LEVOTHYROXINE SODIUM 125 MCG
1 TABLET ORAL
Qty: 0 | Refills: 0 | DISCHARGE

## 2021-11-11 RX ORDER — CITALOPRAM 10 MG/1
1 TABLET, FILM COATED ORAL
Qty: 0 | Refills: 0 | DISCHARGE

## 2021-11-11 RX ORDER — ACETAMINOPHEN 500 MG
1000 TABLET ORAL ONCE
Refills: 0 | Status: COMPLETED | OUTPATIENT
Start: 2021-11-11 | End: 2021-11-11

## 2021-11-11 RX ORDER — PANTOPRAZOLE SODIUM 20 MG/1
40 TABLET, DELAYED RELEASE ORAL DAILY
Refills: 0 | Status: DISCONTINUED | OUTPATIENT
Start: 2021-11-11 | End: 2021-11-13

## 2021-11-11 RX ORDER — BUPIVACAINE 13.3 MG/ML
20 INJECTION, SUSPENSION, LIPOSOMAL INFILTRATION ONCE
Refills: 0 | Status: DISCONTINUED | OUTPATIENT
Start: 2021-11-11 | End: 2021-11-11

## 2021-11-11 RX ORDER — HYDROMORPHONE HYDROCHLORIDE 2 MG/ML
0.25 INJECTION INTRAMUSCULAR; INTRAVENOUS; SUBCUTANEOUS ONCE
Refills: 0 | Status: DISCONTINUED | OUTPATIENT
Start: 2021-11-11 | End: 2021-11-11

## 2021-11-11 RX ORDER — HYDROMORPHONE HYDROCHLORIDE 2 MG/ML
0.25 INJECTION INTRAMUSCULAR; INTRAVENOUS; SUBCUTANEOUS
Refills: 0 | Status: DISCONTINUED | OUTPATIENT
Start: 2021-11-11 | End: 2021-11-11

## 2021-11-11 RX ADMIN — PANTOPRAZOLE SODIUM 40 MILLIGRAM(S): 20 TABLET, DELAYED RELEASE ORAL at 11:04

## 2021-11-11 RX ADMIN — HYDROMORPHONE HYDROCHLORIDE 0.25 MILLIGRAM(S): 2 INJECTION INTRAMUSCULAR; INTRAVENOUS; SUBCUTANEOUS at 12:46

## 2021-11-11 RX ADMIN — HYDROMORPHONE HYDROCHLORIDE 0.25 MILLIGRAM(S): 2 INJECTION INTRAMUSCULAR; INTRAVENOUS; SUBCUTANEOUS at 11:51

## 2021-11-11 RX ADMIN — Medication 1000 MILLIGRAM(S): at 20:49

## 2021-11-11 RX ADMIN — Medication 400 MILLIGRAM(S): at 20:34

## 2021-11-11 RX ADMIN — Medication 1000 MILLIGRAM(S): at 14:40

## 2021-11-11 RX ADMIN — SCOPALAMINE 1 PATCH: 1 PATCH, EXTENDED RELEASE TRANSDERMAL at 20:02

## 2021-11-11 RX ADMIN — ENOXAPARIN SODIUM 40 MILLIGRAM(S): 100 INJECTION SUBCUTANEOUS at 06:44

## 2021-11-11 RX ADMIN — Medication 400 MILLIGRAM(S): at 14:27

## 2021-11-11 RX ADMIN — GABAPENTIN 300 MILLIGRAM(S): 400 CAPSULE ORAL at 06:44

## 2021-11-11 RX ADMIN — SCOPALAMINE 1 PATCH: 1 PATCH, EXTENDED RELEASE TRANSDERMAL at 06:45

## 2021-11-11 RX ADMIN — Medication 1000 MILLIGRAM(S): at 06:44

## 2021-11-11 RX ADMIN — HYDROMORPHONE HYDROCHLORIDE 0.25 MILLIGRAM(S): 2 INJECTION INTRAMUSCULAR; INTRAVENOUS; SUBCUTANEOUS at 23:50

## 2021-11-11 RX ADMIN — HYDROMORPHONE HYDROCHLORIDE 0.25 MILLIGRAM(S): 2 INJECTION INTRAMUSCULAR; INTRAVENOUS; SUBCUTANEOUS at 23:35

## 2021-11-11 NOTE — DISCHARGE NOTE PROVIDER - NSDCCPCAREPLAN_GEN_ALL_CORE_FT
PRINCIPAL DISCHARGE DIAGNOSIS  Diagnosis: Ventral hernia  Assessment and Plan of Treatment: 51 year old woman with a past surgical history of Dia-en-Y gastric bypass in 2014 at an outside hospital and a subsequent hiatal hernia repair and fundoplication of the remnant stomach 11/14/2019 who presented to the outpatient office with difficulty eating and complaints of food sticking. s/p robotic assisted hiatal hernia repair , extensive  ARLENE . Pt tolerated the procedure well. At time of discharge, pt was tolerating a regular diet, and pt's pain was controlled. Plan is to follow up with Dr. Anne in the office.  -Continue clear liquid diet   -Activity: no heavy lifting or strenuous exercise for one month.  -You may shower but no soaking baths, no swimming pools.  -Notify physician for fever greater than 101, worsening abdominal pain, bleeding or drainage from incision sites.   -Follow-up with Dr. Anne in 1 week. Call the office to make an appointment.  - Please take Tylenol 650mg every 4 to 6 hours as needed for moderate to severe pain control. Please do not exceed 4,000 mg of Tylenol a day.  - Please take Protonix 40 mg by mouth daily.       PRINCIPAL DISCHARGE DIAGNOSIS  Diagnosis: Gastric fistula  Assessment and Plan of Treatment: 51 year old woman with a past surgical history of Dia-en-Y gastric bypass in 2014 at an outside hospital and a subsequent hiatal hernia repair and fundoplication of the remnant stomach 11/14/2019 who presented to the outpatient office with difficulty eating and complaints of food sticking. s/p robotic assisted hiatal hernia repair , extensive  ARLENE . Pt tolerated the procedure well. At time of discharge, pt was tolerating a clear liquid diet, and pt's pain was controlled. Plan is to follow up with Dr. Anne  in the office.  -Continue clear liquid diet   -Activity: no heavy lifting or strenuous exercise for one month.  -You may shower but no soaking baths, no swimming pools.  -Notify physician for fever greater than 101, worsening abdominal pain, bleeding or drainage from incision sites.   -Follow-up with Dr. Anne in 1 week. Call the office to make an appointment.  - Please take Tylenol 650mg every 4 to 6 hours as needed for moderate to severe pain control. Please do not exceed 4,000 mg of Tylenol a day.  - Please take Protonix 40 mg by mouth daily.

## 2021-11-11 NOTE — H&P ADULT - ASSESSMENT
51F PMH GERD, hypothyroidism and PSH Dia-en-Y gastric bypass and hiatal hernia repair with fundoplication of the remnant stomach who presents for revision Nissen fundoplication for recurrent hiatal hernia demonstrated on EGD and UGI as well as symptoms of dysphagia.

## 2021-11-11 NOTE — ASU PREOP CHECKLIST - AICD PRESENT
Subjective:      Patient ID: Melita Romero is a 55 y.o. female.    Chief Complaint: Follow-up (3m) and Annual Exam    Pt is here for  Prevention exam. Just recently completed  right shoulder scope surgery by Dr Nima Delgado. Doing well. Now in PT. Using percocet for bad days related to PT. Still on norco bid to tid. She's chronically on hydrocodone 75 tablets per month   She has a history of hypertension hyperlipidemia and diabetes controlled with metformin, general anxiety and depression disorders controlled with prn xanax and zoloft and wellbutrin. .  She's a chronic smoker as well.  Willing to review labs. Needing mammogram. gerd symptoms stable. Still smoking. Mood ok. Hyperlipidemia controlled with statin. Needing eye exam for diabetes.         Lab Results   Component Value Date    WBC 7.07 07/18/2017    HGB 14.0 07/18/2017    HCT 42.9 07/18/2017     07/18/2017    CHOL 168 07/18/2017    TRIG 189 (H) 07/18/2017    HDL 44 07/18/2017    ALT 19 07/18/2017    AST 21 07/18/2017     07/18/2017    K 3.9 07/18/2017     07/18/2017    CREATININE 0.9 07/18/2017    BUN 19 07/18/2017    CO2 29 07/18/2017    TSH 2.994 07/18/2017    HGBA1C 5.4 07/18/2017       Review of Systems   Constitutional: Positive for activity change. Negative for chills, fatigue and fever.   HENT: Negative for ear pain and trouble swallowing.    Eyes: Negative for pain and visual disturbance.   Respiratory: Negative for cough and shortness of breath.    Cardiovascular: Negative for chest pain and leg swelling.   Gastrointestinal: Negative for abdominal pain, blood in stool, nausea and vomiting.   Endocrine: Negative for cold intolerance and heat intolerance.   Genitourinary: Negative for dysuria and frequency.   Musculoskeletal: Positive for arthralgias and back pain. Negative for joint swelling, myalgias and neck pain.   Skin: Negative for color change and rash.   Neurological: Negative for dizziness and headaches.    Psychiatric/Behavioral: Negative for behavioral problems, decreased concentration, dysphoric mood and sleep disturbance. The patient is nervous/anxious.      Objective:     Physical Exam   Constitutional: She is oriented to person, place, and time. She appears well-developed and well-nourished.   HENT:   Head: Normocephalic and atraumatic.   Right Ear: External ear normal.   Left Ear: External ear normal.   Mouth/Throat: Oropharynx is clear and moist.   Eyes: EOM are normal.   Neck: Normal range of motion. Neck supple. No thyromegaly present.   Cardiovascular: Normal rate and regular rhythm.  Exam reveals no gallop and no friction rub.    No murmur heard.  Pulses:       Dorsalis pedis pulses are 2+ on the right side, and 2+ on the left side.   Pulmonary/Chest: Effort normal. No respiratory distress. She has no wheezes. She has no rales.   Abdominal: Soft. Bowel sounds are normal. She exhibits no distension. There is no tenderness. There is no rebound.   Musculoskeletal: She exhibits no edema.        Right shoulder: She exhibits decreased range of motion, tenderness and pain. She exhibits no bony tenderness and no spasm.        Thoracic back: She exhibits decreased range of motion, tenderness, bony tenderness, pain and spasm.        Right foot: There is normal range of motion and no deformity.        Left foot: There is normal range of motion and no deformity.   Feet:   Right Foot:   Protective Sensation: 4 sites tested. 4 sites sensed.   Skin Integrity: Positive for warmth. Negative for ulcer, blister, skin breakdown, erythema, callus or dry skin.   Left Foot:   Protective Sensation: 4 sites tested. 4 sites sensed.   Skin Integrity: Positive for warmth. Negative for ulcer, blister, skin breakdown, erythema, callus or dry skin.   Lymphadenopathy:     She has no cervical adenopathy.   Neurological: She is alert and oriented to person, place, and time.   Skin: Skin is warm and dry. No rash noted.   Psychiatric: Her  speech is normal and behavior is normal. Judgment and thought content normal. Her mood appears anxious.   Vitals reviewed.    Assessment:     1. Routine general medical examination at a health care facility    2. Severe obesity (BMI 35.0-39.9) with comorbidity    3. Complete tear of right rotator cuff    4. Chronic prescription opiate use    5. Encounter for screening mammogram for breast cancer    6. Controlled type 2 diabetes mellitus without complication, without long-term current use of insulin    7. Mixed hyperlipidemia    8. Moderate episode of recurrent major depressive disorder    9. Smoker    10. Vitamin D deficiency disease    11. Gastroesophageal reflux disease, esophagitis presence not specified    12. NALLELY (generalized anxiety disorder)    13. Essential hypertension    14. DDD (degenerative disc disease), lumbar      Plan:   Melita was seen today for follow-up and annual exam.    Diagnoses and all orders for this visit:    Routine general medical examination at a health care facility- - labs reviewed. Discussed Health Maintenance issues.       Severe obesity (BMI 35.0-39.9) with comorbidity- working on exercise.     Complete tear of right rotator cuff- s/p surgery. Now in PT.     Chronic prescription opiate use  Comments:  currently under ortho post op using percocet for extreme pain/pt sessions for shoulder. on norco still 75 per month. xanax today, refilled. reviewed    Orders:  -     TOXICOLOGY SCREEN, URINE, RANDOM (COMPLIANCE)    Encounter for screening mammogram for breast cancer- schedule  -     Mammo Digital Screening Bilat with CAD; Future    Controlled type 2 diabetes mellitus without complication, without long-term current use of insulin- improving with metformin. Schedule eye exam.   -     Ambulatory referral to Optometry    Mixed hyperlipidemia - stable, Continue with current medications and interventions. Labs reviewed.       Moderate episode of recurrent major depressive disorder-  stable, Continue with current medications and interventions. Labs reviewed.         Smoker- working on decreasing.     Vitamin D deficiency disease- stable, Continue with current medications and interventions. Labs reviewed.         Gastroesophageal reflux disease, esophagitis presence not specified- stable, Continue with current medications and interventions. Labs reviewed.         NALLELY (generalized anxiety disorder)- stable, Continue with current medications and interventions. Labs reviewed.         Essential hypertension- stable, Continue with current medications and interventions. Labs reviewed.         DDD (degenerative disc disease), lumbar- refilled norco. Reviewed . Because the patient is currently being treated for chronic pain, the medications were reviewed using the [place] prescription monitoring program.  Currently the patient complies with guidelines from one prescriber for chronic narcotics.  I will continue to refill the medications again for up to an additional 12 weeks.  the patient must seen in the office again after that time.      Other orders  -     alprazolam (XANAX) 0.5 MG tablet; Take 1 tablet (0.5 mg total) by mouth daily as needed for Anxiety.            Return in about 3 months (around 10/25/2017) for chronic pain meds.           no

## 2021-11-11 NOTE — DISCHARGE NOTE PROVIDER - NSDCCPGOAL_GEN_ALL_CORE_FT
To get better and follow your care plan as instructed.  -Continue clear liquid diet   -Activity: no heavy lifting or strenuous exercise for one month.  -You may shower but no soaking baths, no swimming pools.  -Notify physician for fever greater than 101, worsening abdominal pain, bleeding or drainage from incision sites.   -Follow-up with Dr. Anne in 1 week. Call the office to make an appointment.  - Please take Tylenol 650mg every 4 to 6 hours as needed for moderate to severe pain control. Please do not exceed 4,000 mg of Tylenol a day.  - Please take Protonix 40 mg by mouth daily.

## 2021-11-11 NOTE — H&P ADULT - PROBLEM SELECTOR PLAN 1
recurrent sliding hiatal hernia with symptoms of dysphagia  redo fundoplication of remnant stomach and hiatal hernia repair  postoperative admission

## 2021-11-11 NOTE — DISCHARGE NOTE PROVIDER - HOSPITAL COURSE
*******************51 year old woman with a past surgical history of Dia-en-Y gastric bypass in 2014 at an outside hospital and a subsequent hiatal hernia repair and fundoplication of the remnant stomach 11/14/2019 who presented to the outpatient office with difficulty eating and complaints of food sticking. s/p robotic assisted hiatal hernia repair , extensive  ARLENE . Pt tolerated the procedure well. At time of discharge, pt was tolerating a clear liquid diet, and pt's pain was controlled. Plan is to follow up with Dr. Anne  in the office.  -Continue clear liquid diet   -Activity: no heavy lifting or strenuous exercise for one month.  -You may shower but no soaking baths, no swimming pools.  -Notify physician for fever greater than 101, worsening abdominal pain, bleeding or drainage from incision sites.   -Follow-up with Dr. Anne in 1 week. Call the office to make an appointment.  - Please take Tylenol 650mg every 4 to 6 hours as needed for moderate to severe pain control. Please do not exceed 4,000 mg of Tylenol a day.  - Please take Protonix 40 mg by mouth daily.     Incomplete note **********************     51 year old woman with a past surgical history of Dia-en-Y gastric bypass in 2014 at an outside hospital and a subsequent hiatal hernia repair and fundoplication of the remnant stomach 11/14/2019 who presented to the outpatient office with difficulty eating and complaints of food sticking. s/p robotic assisted hiatal hernia repair , extensive  ARLENE . Pt tolerated the procedure well. At time of discharge, pt was tolerating a clear liquid diet, and pt's pain was controlled. Plan is to follow up with Dr. Anne  in the office      -Continue clear liquid diet   -Activity: no heavy lifting or strenuous exercise for one month.  -You may shower but no soaking baths, no swimming pools.  -Notify physician for fever greater than 101, worsening abdominal pain, bleeding or drainage from incision sites.   -Follow-up with Dr. Anne in 1 week. Call the office to make an appointment.  - Please take Tylenol 650mg every 4 to 6 hours as needed for moderate to severe pain control. Please do not exceed 4,000 mg of Tylenol a day.  - Please take Protonix 40 mg by mouth daily.

## 2021-11-11 NOTE — PROGRESS NOTE ADULT - ASSESSMENT
51 year old woman with a past surgical history of Dia-en-Y gastric bypass in 2014 at an outside hospital and a subsequent hiatal hernia repair and fundoplication of the remnant stomach 11/14/2019 who presented to the outpatient office with difficulty eating and complaints of food sticking.s/p robotic assisted hiatal hernia repair , extensive  ARLENE     -Pain/nausea control   -NPO, IVF   -Protonix   -CBC 6hrs post-op   -SCDs, OOB/A, IS   -AM labs   -f/u MISTY yvonne output

## 2021-11-11 NOTE — H&P ADULT - HISTORY OF PRESENT ILLNESS
The patient is a 51 year old woman with a past surgical history of Dia-en-Y gastric bypass in 2014 at an outside hospital and a subsequent hiatal hernia repair and fundoplication of the remnant stomach 11/14/2019 who presented to the outpatient office with difficulty eating and complaints of food sticking. Her symptoms are interfering with daily life. Upper GI and EGD revealed a recurrent hiatal hernia. She presents today for revision Nissen and hiatal hernia repair.

## 2021-11-11 NOTE — H&P ADULT - NSHPPHYSICALEXAM_GEN_ALL_CORE
well developed, well nourished, in no acute distress.   Eyes: PERRL, EOMI, no conjunctival infection, anicteric.   Pulmonary:. normal respiration.   Musculoskeletal: full range of motion and no deformities appreciated.   Skin: normal appearance, no rash, nodules, vesicles, ulcers, erythema.   Neurology: grossly intact.   Psychiatric: affect appropriate.

## 2021-11-11 NOTE — DISCHARGE NOTE PROVIDER - CARE PROVIDER_API CALL
Cristhian Cintron (MD)  Surgery  [address] [address]  Phone: [phone]  Fax: [phone]  Scheduled Appointment: 11/19/2021

## 2021-11-11 NOTE — BRIEF OPERATIVE NOTE - OPERATION/FINDINGS
Recurrent sliding hiatal hernia previously status post Nissen fundoplication of remnant stomach around gastric pouch    Extensive adhesiolysis for >2 hrs to dissect patient's Dia-en-Y anatomy  previous fundoplication taken down  Posterior diaphragmatic crura reapproximated with interrupted sutures of Ethibond with plegets  Single tacking suture from superior pouch to diaphragm  MISTY placed under the liver over the gastric pouch

## 2021-11-11 NOTE — DISCHARGE NOTE PROVIDER - NSDCFUADDINST_GEN_ALL_CORE_FT
-Continue clear liquid diet   -Activity: no heavy lifting or strenuous exercise for one month.  -You may shower but no soaking baths, no swimming pools.  -Notify physician for fever greater than 101, worsening abdominal pain, bleeding or drainage from incision sites.   -Follow-up with Dr. Anne in 1 week. Call the office to make an appointment.  - Please take Tylenol 650mg every 4 to 6 hours as needed for moderate to severe pain control. Please do not exceed 4,000 mg of Tylenol a day.  - Please take Protonix 40 mg by mouth daily.  -Continue clear liquid diet   -Activity: no heavy lifting or strenuous exercise for one month.  -You may shower but no soaking baths, no swimming pools.  -Notify physician for fever greater than 101, worsening abdominal pain, bleeding or drainage from incision sites.   -Follow-up with Dr. Anne in 1 week. Call the office to make an appointment.  - Please take Tylenol 650mg every 4 to 6 hours as needed for moderate to severe pain control. Please do not exceed 4,000 mg of Tylenol a day.  - Please take Protonix 40 mg by mouth daily.     You may shower; soap and water over incision sites. Do not scrub. Pat dry when done. Change gauze dressing and Tegaderm dressing daily after showering for a few days

## 2021-11-11 NOTE — ASU PREOP CHECKLIST - SELECT TESTS ORDERED
BMP/CBC/CMP/PT/PTT/INR/Type and Screen/Urinalysis/EKG/CXR/COVID-19 preg test negative-DOS/BMP/CBC/CMP/PT/PTT/INR/Type and Screen/Urinalysis/UCG/EKG/CXR/COVID-19

## 2021-11-11 NOTE — DISCHARGE NOTE PROVIDER - NSDCCPTREATMENT_GEN_ALL_CORE_FT
PRINCIPAL PROCEDURE  Procedure: Robot-assisted repair of sliding hiatal hernia  Findings and Treatment:

## 2021-11-11 NOTE — DISCHARGE NOTE PROVIDER - NSDCMRMEDTOKEN_GEN_ALL_CORE_FT
CeleXA 20 mg oral tablet: 1 tab(s) orally once a day  magnesium chloride 64 mg oral tablet, extended release:   Percocet 5/325 oral tablet: 1 tab(s) orally every 6 hours, As Needed -for severe pain MDD:4 tabs   Protonix 40 mg oral delayed release tablet: 1 tab(s) orally once a day   Synthroid 125 mcg (0.125 mg) oral tablet: 1 tab(s) orally once a day  Therapeutic Multiple Vitamins oral tablet: 1 tab(s) orally once a day   acetaminophen 325 mg oral tablet: 2 tab(s) orally every 6 hours, As needed for pain; MDD:8  CeleXA 20 mg oral tablet: 1 tab(s) orally once a day  magnesium chloride 64 mg oral tablet, extended release:   Protonix 40 mg oral delayed release tablet: 1 tab(s) orally once a day   Synthroid 125 mcg (0.125 mg) oral tablet: 1 tab(s) orally once a day  Therapeutic Multiple Vitamins oral tablet: 1 tab(s) orally once a day

## 2021-11-11 NOTE — H&P ADULT - NSICDXPASTSURGICALHX_GEN_ALL_CORE_FT
PAST SURGICAL HISTORY:  Elective surgery left wrist    H/O arthroscopy of right knee     H/O gastric bypass 2014 at [facility]

## 2021-11-11 NOTE — PROGRESS NOTE ADULT - SUBJECTIVE AND OBJECTIVE BOX
POST-OPERATIVE NOTE    Procedure: robotic assisted hiatal hernia repair , extensive  ARLENE , MISTY drain     Surgeon: Dr. Anne    S: Pt has no complaints. Denies CP, SOB, TORRES, calf tenderness. Pain controlled with medication.    O:  T(C): 36.7 (11-11-21 @ 11:54), Max: 36.7 (11-11-21 @ 11:54)  T(F): 98.1 (11-11-21 @ 11:54), Max: 98.1 (11-11-21 @ 11:54)  HR: 77 (11-11-21 @ 14:00) (63 - 77)  BP: 132/78 (11-11-21 @ 14:00) (123/70 - 154/83)  RR: 14 (11-11-21 @ 14:00) (13 - 22)  SpO2: 99% (11-11-21 @ 14:00) (99% - 100%)  Wt(kg): --            Gen: NAD, resting comfortably in bed  C/V: NSR  Pulm: Nonlabored breathing, no respiratory distress  Abd: soft, non distended , TTP around incision site  , incision clean dry and intact.  Extrem: WWP, no calf edema, SCDs in place

## 2021-11-11 NOTE — PACU DISCHARGE NOTE - COMMENTS
Pt A&ox4, operative site clean, dry and intact with earle x1; pain controlled; plan of care endorsed to receiving tejal Flores

## 2021-11-12 LAB
ANION GAP SERPL CALC-SCNC: 9 MMOL/L — SIGNIFICANT CHANGE UP (ref 5–17)
BASOPHILS # BLD AUTO: 0.02 K/UL — SIGNIFICANT CHANGE UP (ref 0–0.2)
BASOPHILS NFR BLD AUTO: 0.3 % — SIGNIFICANT CHANGE UP (ref 0–2)
BUN SERPL-MCNC: 9 MG/DL — SIGNIFICANT CHANGE UP (ref 7–23)
CALCIUM SERPL-MCNC: 8.8 MG/DL — SIGNIFICANT CHANGE UP (ref 8.4–10.5)
CHLORIDE SERPL-SCNC: 105 MMOL/L — SIGNIFICANT CHANGE UP (ref 96–108)
CO2 SERPL-SCNC: 27 MMOL/L — SIGNIFICANT CHANGE UP (ref 22–31)
COVID-19 NUCLEOCAPSID GAM AB INTERP: NEGATIVE — SIGNIFICANT CHANGE UP
COVID-19 NUCLEOCAPSID TOTAL GAM ANTIBODY RESULT: 0.07 INDEX — SIGNIFICANT CHANGE UP
COVID-19 SPIKE DOMAIN AB INTERP: POSITIVE
COVID-19 SPIKE DOMAIN ANTIBODY RESULT: 37.1 U/ML — HIGH
CREAT SERPL-MCNC: 0.62 MG/DL — SIGNIFICANT CHANGE UP (ref 0.5–1.3)
EOSINOPHIL # BLD AUTO: 0.02 K/UL — SIGNIFICANT CHANGE UP (ref 0–0.5)
EOSINOPHIL NFR BLD AUTO: 0.3 % — SIGNIFICANT CHANGE UP (ref 0–6)
GLUCOSE SERPL-MCNC: 95 MG/DL — SIGNIFICANT CHANGE UP (ref 70–99)
HCT VFR BLD CALC: 32.8 % — LOW (ref 34.5–45)
HGB BLD-MCNC: 10.1 G/DL — LOW (ref 11.5–15.5)
IMM GRANULOCYTES NFR BLD AUTO: 0.3 % — SIGNIFICANT CHANGE UP (ref 0–1.5)
LYMPHOCYTES # BLD AUTO: 1.83 K/UL — SIGNIFICANT CHANGE UP (ref 1–3.3)
LYMPHOCYTES # BLD AUTO: 27 % — SIGNIFICANT CHANGE UP (ref 13–44)
MAGNESIUM SERPL-MCNC: 1.6 MG/DL — SIGNIFICANT CHANGE UP (ref 1.6–2.6)
MCHC RBC-ENTMCNC: 27.6 PG — SIGNIFICANT CHANGE UP (ref 27–34)
MCHC RBC-ENTMCNC: 30.8 GM/DL — LOW (ref 32–36)
MCV RBC AUTO: 89.6 FL — SIGNIFICANT CHANGE UP (ref 80–100)
MONOCYTES # BLD AUTO: 0.58 K/UL — SIGNIFICANT CHANGE UP (ref 0–0.9)
MONOCYTES NFR BLD AUTO: 8.6 % — SIGNIFICANT CHANGE UP (ref 2–14)
NEUTROPHILS # BLD AUTO: 4.31 K/UL — SIGNIFICANT CHANGE UP (ref 1.8–7.4)
NEUTROPHILS NFR BLD AUTO: 63.5 % — SIGNIFICANT CHANGE UP (ref 43–77)
NRBC # BLD: 0 /100 WBCS — SIGNIFICANT CHANGE UP (ref 0–0)
PHOSPHATE SERPL-MCNC: 3.1 MG/DL — SIGNIFICANT CHANGE UP (ref 2.5–4.5)
PLATELET # BLD AUTO: 181 K/UL — SIGNIFICANT CHANGE UP (ref 150–400)
POTASSIUM SERPL-MCNC: 4 MMOL/L — SIGNIFICANT CHANGE UP (ref 3.5–5.3)
POTASSIUM SERPL-SCNC: 4 MMOL/L — SIGNIFICANT CHANGE UP (ref 3.5–5.3)
RBC # BLD: 3.66 M/UL — LOW (ref 3.8–5.2)
RBC # FLD: 14.4 % — SIGNIFICANT CHANGE UP (ref 10.3–14.5)
SARS-COV-2 IGG+IGM SERPL QL IA: 0.07 INDEX — SIGNIFICANT CHANGE UP
SARS-COV-2 IGG+IGM SERPL QL IA: 37.1 U/ML — HIGH
SARS-COV-2 IGG+IGM SERPL QL IA: NEGATIVE — SIGNIFICANT CHANGE UP
SARS-COV-2 IGG+IGM SERPL QL IA: POSITIVE
SODIUM SERPL-SCNC: 141 MMOL/L — SIGNIFICANT CHANGE UP (ref 135–145)
WBC # BLD: 6.78 K/UL — SIGNIFICANT CHANGE UP (ref 3.8–10.5)
WBC # FLD AUTO: 6.78 K/UL — SIGNIFICANT CHANGE UP (ref 3.8–10.5)

## 2021-11-12 PROCEDURE — 74240 X-RAY XM UPR GI TRC 1CNTRST: CPT | Mod: 26

## 2021-11-12 RX ORDER — HYDROMORPHONE HYDROCHLORIDE 2 MG/ML
0.5 INJECTION INTRAMUSCULAR; INTRAVENOUS; SUBCUTANEOUS EVERY 6 HOURS
Refills: 0 | Status: DISCONTINUED | OUTPATIENT
Start: 2021-11-12 | End: 2021-11-12

## 2021-11-12 RX ORDER — OXYCODONE HYDROCHLORIDE 5 MG/1
10 TABLET ORAL EVERY 6 HOURS
Refills: 0 | Status: DISCONTINUED | OUTPATIENT
Start: 2021-11-12 | End: 2021-11-13

## 2021-11-12 RX ORDER — KETOROLAC TROMETHAMINE 30 MG/ML
15 SYRINGE (ML) INJECTION EVERY 6 HOURS
Refills: 0 | Status: DISCONTINUED | OUTPATIENT
Start: 2021-11-12 | End: 2021-11-12

## 2021-11-12 RX ORDER — ACETAMINOPHEN 500 MG
650 TABLET ORAL EVERY 6 HOURS
Refills: 0 | Status: DISCONTINUED | OUTPATIENT
Start: 2021-11-12 | End: 2021-11-13

## 2021-11-12 RX ORDER — LEVOTHYROXINE SODIUM 125 MCG
125 TABLET ORAL DAILY
Refills: 0 | Status: DISCONTINUED | OUTPATIENT
Start: 2021-11-12 | End: 2021-11-13

## 2021-11-12 RX ORDER — CITALOPRAM 10 MG/1
20 TABLET, FILM COATED ORAL DAILY
Refills: 0 | Status: DISCONTINUED | OUTPATIENT
Start: 2021-11-12 | End: 2021-11-13

## 2021-11-12 RX ORDER — OXYCODONE HYDROCHLORIDE 5 MG/1
5 TABLET ORAL EVERY 6 HOURS
Refills: 0 | Status: DISCONTINUED | OUTPATIENT
Start: 2021-11-12 | End: 2021-11-13

## 2021-11-12 RX ORDER — HYDROMORPHONE HYDROCHLORIDE 2 MG/ML
0.25 INJECTION INTRAMUSCULAR; INTRAVENOUS; SUBCUTANEOUS EVERY 6 HOURS
Refills: 0 | Status: DISCONTINUED | OUTPATIENT
Start: 2021-11-12 | End: 2021-11-12

## 2021-11-12 RX ORDER — MAGNESIUM SULFATE 500 MG/ML
2 VIAL (ML) INJECTION ONCE
Refills: 0 | Status: COMPLETED | OUTPATIENT
Start: 2021-11-12 | End: 2021-11-12

## 2021-11-12 RX ORDER — ENOXAPARIN SODIUM 100 MG/ML
40 INJECTION SUBCUTANEOUS DAILY
Refills: 0 | Status: DISCONTINUED | OUTPATIENT
Start: 2021-11-12 | End: 2021-11-12

## 2021-11-12 RX ORDER — ACETAMINOPHEN 500 MG
1000 TABLET ORAL ONCE
Refills: 0 | Status: COMPLETED | OUTPATIENT
Start: 2021-11-12 | End: 2021-11-12

## 2021-11-12 RX ORDER — KETOROLAC TROMETHAMINE 30 MG/ML
15 SYRINGE (ML) INJECTION EVERY 6 HOURS
Refills: 0 | Status: DISCONTINUED | OUTPATIENT
Start: 2021-11-12 | End: 2021-11-13

## 2021-11-12 RX ORDER — ENOXAPARIN SODIUM 100 MG/ML
30 INJECTION SUBCUTANEOUS DAILY
Refills: 0 | Status: DISCONTINUED | OUTPATIENT
Start: 2021-11-12 | End: 2021-11-13

## 2021-11-12 RX ADMIN — Medication 1000 MILLIGRAM(S): at 07:25

## 2021-11-12 RX ADMIN — SCOPALAMINE 1 PATCH: 1 PATCH, EXTENDED RELEASE TRANSDERMAL at 07:20

## 2021-11-12 RX ADMIN — PANTOPRAZOLE SODIUM 40 MILLIGRAM(S): 20 TABLET, DELAYED RELEASE ORAL at 11:56

## 2021-11-12 RX ADMIN — HYDROMORPHONE HYDROCHLORIDE 0.5 MILLIGRAM(S): 2 INJECTION INTRAMUSCULAR; INTRAVENOUS; SUBCUTANEOUS at 04:04

## 2021-11-12 RX ADMIN — Medication 50 GRAM(S): at 13:22

## 2021-11-12 RX ADMIN — Medication 15 MILLIGRAM(S): at 17:28

## 2021-11-12 RX ADMIN — CITALOPRAM 20 MILLIGRAM(S): 10 TABLET, FILM COATED ORAL at 11:15

## 2021-11-12 RX ADMIN — HYDROMORPHONE HYDROCHLORIDE 0.25 MILLIGRAM(S): 2 INJECTION INTRAMUSCULAR; INTRAVENOUS; SUBCUTANEOUS at 06:34

## 2021-11-12 RX ADMIN — ENOXAPARIN SODIUM 30 MILLIGRAM(S): 100 INJECTION SUBCUTANEOUS at 13:59

## 2021-11-12 RX ADMIN — OXYCODONE HYDROCHLORIDE 10 MILLIGRAM(S): 5 TABLET ORAL at 12:51

## 2021-11-12 RX ADMIN — Medication 650 MILLIGRAM(S): at 16:35

## 2021-11-12 RX ADMIN — OXYCODONE HYDROCHLORIDE 10 MILLIGRAM(S): 5 TABLET ORAL at 21:59

## 2021-11-12 RX ADMIN — Medication 1000 MILLIGRAM(S): at 01:58

## 2021-11-12 RX ADMIN — Medication 650 MILLIGRAM(S): at 17:05

## 2021-11-12 RX ADMIN — Medication 15 MILLIGRAM(S): at 17:43

## 2021-11-12 RX ADMIN — SCOPALAMINE 1 PATCH: 1 PATCH, EXTENDED RELEASE TRANSDERMAL at 18:04

## 2021-11-12 RX ADMIN — OXYCODONE HYDROCHLORIDE 10 MILLIGRAM(S): 5 TABLET ORAL at 20:59

## 2021-11-12 RX ADMIN — HYDROMORPHONE HYDROCHLORIDE 0.5 MILLIGRAM(S): 2 INJECTION INTRAMUSCULAR; INTRAVENOUS; SUBCUTANEOUS at 04:19

## 2021-11-12 RX ADMIN — HYDROMORPHONE HYDROCHLORIDE 0.25 MILLIGRAM(S): 2 INJECTION INTRAMUSCULAR; INTRAVENOUS; SUBCUTANEOUS at 06:49

## 2021-11-12 RX ADMIN — Medication 15 MILLIGRAM(S): at 23:24

## 2021-11-12 RX ADMIN — OXYCODONE HYDROCHLORIDE 10 MILLIGRAM(S): 5 TABLET ORAL at 13:30

## 2021-11-12 RX ADMIN — Medication 125 MICROGRAM(S): at 10:52

## 2021-11-12 RX ADMIN — Medication 400 MILLIGRAM(S): at 07:10

## 2021-11-12 RX ADMIN — Medication 400 MILLIGRAM(S): at 01:43

## 2021-11-12 NOTE — PROGRESS NOTE ADULT - SUBJECTIVE AND OBJECTIVE BOX
STATUS POST:  robotic assisted hiatal hernia repair , extensive  ARLENE     O/N: c/o pain, appropriate urine out     SUBJECTIVE: Patient seen and examined bedside by chief resident. Patient is complaining of abdominal pain. She denies n/v.    enoxaparin Injectable 30 milliGRAM(s) SubCutaneous daily      Vital Signs Last 24 Hrs  T(C): 36.7 (12 Nov 2021 08:38), Max: 37 (12 Nov 2021 05:29)  T(F): 98.1 (12 Nov 2021 08:38), Max: 98.6 (12 Nov 2021 05:29)  HR: 51 (12 Nov 2021 08:38) (51 - 98)  BP: 132/73 (12 Nov 2021 08:38) (102/65 - 143/81)  BP(mean): 97 (12 Nov 2021 05:29) (95 - 102)  RR: 17 (12 Nov 2021 08:38) (13 - 18)  SpO2: 96% (12 Nov 2021 08:38) (95% - 100%)  I&O's Detail    11 Nov 2021 07:01  -  12 Nov 2021 07:00  --------------------------------------------------------  IN:    IV PiggyBack: 400 mL    Lactated Ringers: 2200 mL  Total IN: 2600 mL    OUT:    Bulb (mL): 250 mL    Oral Fluid: 0 mL    Voided (mL): 1975 mL  Total OUT: 2225 mL    Total NET: 375 mL      12 Nov 2021 07:01  -  12 Nov 2021 11:40  --------------------------------------------------------  IN:    Lactated Ringers: 400 mL  Total IN: 400 mL    OUT:    Bulb (mL): 10 mL    Oral Fluid: 0 mL    Voided (mL): 700 mL  Total OUT: 710 mL    Total NET: -310 mL          Physical Exam:  Gen: NAD, resting comfortably in bed  C/V: NSR  Pulm: Nonlabored breathing, no respiratory distress  Abd: soft, non distended , TTP around incision site, incision clean dry and intact. JPx1 SS  Extrem: WWP, no calf edema, SCDs in place    LABS:                        10.1   6.78  )-----------( 181      ( 12 Nov 2021 06:49 )             32.8     11-12    141  |  105  |  9   ----------------------------<  95  4.0   |  27  |  0.62    Ca    8.8      12 Nov 2021 06:49  Phos  3.1     11-12  Mg     1.6     11-12            RADIOLOGY & ADDITIONAL STUDIES:    51 year old woman with a past surgical history of Dia-en-Y gastric bypass in 2014 at an outside hospital and a subsequent hiatal hernia repair and fundoplication of the remnant stomach 11/14/2019 who presented to the outpatient office with difficulty eating and complaints of food sticking.s/p robotic assisted hiatal hernia repair, extensive  ARLENE     -Pain/nausea control   -CLD, IVF   -Protonix   -SCDs, OOB/A, IS, lovenox   -AM labs   -f/u MISTY yvonne output

## 2021-11-13 ENCOUNTER — TRANSCRIPTION ENCOUNTER (OUTPATIENT)
Age: 51
End: 2021-11-13

## 2021-11-13 VITALS
TEMPERATURE: 99 F | RESPIRATION RATE: 17 BRPM | DIASTOLIC BLOOD PRESSURE: 80 MMHG | OXYGEN SATURATION: 97 % | HEART RATE: 62 BPM | SYSTOLIC BLOOD PRESSURE: 127 MMHG

## 2021-11-13 LAB
ANION GAP SERPL CALC-SCNC: 8 MMOL/L — SIGNIFICANT CHANGE UP (ref 5–17)
BUN SERPL-MCNC: 10 MG/DL — SIGNIFICANT CHANGE UP (ref 7–23)
CALCIUM SERPL-MCNC: 9.2 MG/DL — SIGNIFICANT CHANGE UP (ref 8.4–10.5)
CHLORIDE SERPL-SCNC: 106 MMOL/L — SIGNIFICANT CHANGE UP (ref 96–108)
CO2 SERPL-SCNC: 25 MMOL/L — SIGNIFICANT CHANGE UP (ref 22–31)
CREAT SERPL-MCNC: 0.54 MG/DL — SIGNIFICANT CHANGE UP (ref 0.5–1.3)
GLUCOSE SERPL-MCNC: 93 MG/DL — SIGNIFICANT CHANGE UP (ref 70–99)
HCT VFR BLD CALC: 35.7 % — SIGNIFICANT CHANGE UP (ref 34.5–45)
HGB BLD-MCNC: 11 G/DL — LOW (ref 11.5–15.5)
MAGNESIUM SERPL-MCNC: 2 MG/DL — SIGNIFICANT CHANGE UP (ref 1.6–2.6)
MCHC RBC-ENTMCNC: 27.8 PG — SIGNIFICANT CHANGE UP (ref 27–34)
MCHC RBC-ENTMCNC: 30.8 GM/DL — LOW (ref 32–36)
MCV RBC AUTO: 90.4 FL — SIGNIFICANT CHANGE UP (ref 80–100)
NRBC # BLD: 0 /100 WBCS — SIGNIFICANT CHANGE UP (ref 0–0)
PHOSPHATE SERPL-MCNC: 3 MG/DL — SIGNIFICANT CHANGE UP (ref 2.5–4.5)
PLATELET # BLD AUTO: 173 K/UL — SIGNIFICANT CHANGE UP (ref 150–400)
POTASSIUM SERPL-MCNC: 3.7 MMOL/L — SIGNIFICANT CHANGE UP (ref 3.5–5.3)
POTASSIUM SERPL-SCNC: 3.7 MMOL/L — SIGNIFICANT CHANGE UP (ref 3.5–5.3)
RBC # BLD: 3.95 M/UL — SIGNIFICANT CHANGE UP (ref 3.8–5.2)
RBC # FLD: 14.1 % — SIGNIFICANT CHANGE UP (ref 10.3–14.5)
SODIUM SERPL-SCNC: 139 MMOL/L — SIGNIFICANT CHANGE UP (ref 135–145)
WBC # BLD: 5.9 K/UL — SIGNIFICANT CHANGE UP (ref 3.8–10.5)
WBC # FLD AUTO: 5.9 K/UL — SIGNIFICANT CHANGE UP (ref 3.8–10.5)

## 2021-11-13 RX ORDER — POTASSIUM CHLORIDE 20 MEQ
30 PACKET (EA) ORAL ONCE
Refills: 0 | Status: COMPLETED | OUTPATIENT
Start: 2021-11-13 | End: 2021-11-13

## 2021-11-13 RX ORDER — ACETAMINOPHEN 500 MG
2 TABLET ORAL
Qty: 16 | Refills: 0
Start: 2021-11-13 | End: 2021-11-14

## 2021-11-13 RX ORDER — PANTOPRAZOLE SODIUM 20 MG/1
1 TABLET, DELAYED RELEASE ORAL
Qty: 30 | Refills: 0
Start: 2021-11-13 | End: 2021-12-12

## 2021-11-13 RX ADMIN — SCOPALAMINE 1 PATCH: 1 PATCH, EXTENDED RELEASE TRANSDERMAL at 06:45

## 2021-11-13 RX ADMIN — Medication 15 MILLIGRAM(S): at 11:19

## 2021-11-13 RX ADMIN — Medication 125 MICROGRAM(S): at 06:09

## 2021-11-13 RX ADMIN — Medication 15 MILLIGRAM(S): at 17:04

## 2021-11-13 RX ADMIN — OXYCODONE HYDROCHLORIDE 5 MILLIGRAM(S): 5 TABLET ORAL at 11:34

## 2021-11-13 RX ADMIN — Medication 15 MILLIGRAM(S): at 06:57

## 2021-11-13 RX ADMIN — Medication 15 MILLIGRAM(S): at 00:03

## 2021-11-13 RX ADMIN — ENOXAPARIN SODIUM 30 MILLIGRAM(S): 100 INJECTION SUBCUTANEOUS at 11:04

## 2021-11-13 RX ADMIN — PANTOPRAZOLE SODIUM 40 MILLIGRAM(S): 20 TABLET, DELAYED RELEASE ORAL at 11:04

## 2021-11-13 RX ADMIN — Medication 30 MILLIEQUIVALENT(S): at 10:57

## 2021-11-13 RX ADMIN — CITALOPRAM 20 MILLIGRAM(S): 10 TABLET, FILM COATED ORAL at 11:03

## 2021-11-13 RX ADMIN — OXYCODONE HYDROCHLORIDE 5 MILLIGRAM(S): 5 TABLET ORAL at 11:04

## 2021-11-13 RX ADMIN — Medication 15 MILLIGRAM(S): at 11:04

## 2021-11-13 RX ADMIN — Medication 15 MILLIGRAM(S): at 06:09

## 2021-11-13 NOTE — PROGRESS NOTE ADULT - REASON FOR ADMISSION
revision Nissen fundoplication for recurrent hiatal hernia

## 2021-11-13 NOTE — DISCHARGE NOTE NURSING/CASE MANAGEMENT/SOCIAL WORK - PATIENT PORTAL LINK FT
You can access the FollowMyHealth Patient Portal offered by [facility] by registering at the following website: [email or web address]. By joining [facility]’s FollowMyHealth portal, you will also be able to view your health information using other applications (apps) compatible with our system.

## 2021-11-13 NOTE — PROGRESS NOTE ADULT - SUBJECTIVE AND OBJECTIVE BOX
STATUS POST:  Hiatal hernia repair     SUBJECTIVE: Patient seen and examined bedside by chief resident. Patient is tolerating clear liquid diet. Denies n/v and abdominal pain    enoxaparin Injectable 30 milliGRAM(s) SubCutaneous daily      Vital Signs Last 24 Hrs  T(C): 37.1 (13 Nov 2021 17:13), Max: 37.1 (13 Nov 2021 17:13)  T(F): 98.8 (13 Nov 2021 17:13), Max: 98.8 (13 Nov 2021 17:13)  HR: 62 (13 Nov 2021 17:13) (54 - 67)  BP: 127/80 (13 Nov 2021 17:13) (127/80 - 144/80)  BP(mean): --  RR: 17 (13 Nov 2021 17:13) (16 - 18)  SpO2: 97% (13 Nov 2021 17:13) (97% - 98%)  I&O's Detail    12 Nov 2021 07:01  -  13 Nov 2021 07:00  --------------------------------------------------------  IN:    IV PiggyBack: 50 mL    Lactated Ringers: 300 mL    Oral Fluid: 220 mL  Total IN: 570 mL    OUT:    Bulb (mL): 155 mL    Voided (mL): 3000 mL  Total OUT: 3155 mL    Total NET: -2585 mL      13 Nov 2021 07:01  -  13 Nov 2021 22:58  --------------------------------------------------------  IN:  Total IN: 0 mL    OUT:    Bulb (mL): 30 mL    Voided (mL): 700 mL  Total OUT: 730 mL    Total NET: -730 mL          Physical Exam:  Gen: NAD, resting comfortably in bed  C/V: NSR  Pulm: Nonlabored breathing, no respiratory distress  Abd: soft, non distended , TTP around incision site, incision clean dry and intact. JPx1 SS  Extrem: WWP, no calf edema, SCDs in place    LABS:                        11.0   5.90  )-----------( 173      ( 13 Nov 2021 09:01 )             35.7     11-13    139  |  106  |  10  ----------------------------<  93  3.7   |  25  |  0.54    Ca    9.2      13 Nov 2021 09:01  Phos  3.0     11-13  Mg     2.0     11-13            RADIOLOGY & ADDITIONAL STUDIES:    51 year old woman with a past surgical history of Dia-en-Y gastric bypass in 2014 at an outside hospital and a subsequent hiatal hernia repair and fundoplication of the remnant stomach 11/14/2019 who presented to the outpatient office with difficulty eating and complaints of food sticking.s/p robotic assisted hiatal hernia repair, extensive  ARLENE     -Pain/nausea control   -CLD,  -Protonix   -SCDs, OOB/A, IS, lovenox   -AM labs   - d/c drain and d/c to home

## 2021-11-19 DIAGNOSIS — F41.9 ANXIETY DISORDER, UNSPECIFIED: ICD-10-CM

## 2021-11-19 DIAGNOSIS — E03.9 HYPOTHYROIDISM, UNSPECIFIED: ICD-10-CM

## 2021-11-19 DIAGNOSIS — Z98.84 BARIATRIC SURGERY STATUS: ICD-10-CM

## 2021-11-19 DIAGNOSIS — F17.210 NICOTINE DEPENDENCE, CIGARETTES, UNCOMPLICATED: ICD-10-CM

## 2021-11-19 DIAGNOSIS — K44.9 DIAPHRAGMATIC HERNIA WITHOUT OBSTRUCTION OR GANGRENE: ICD-10-CM

## 2021-11-19 DIAGNOSIS — K31.6 FISTULA OF STOMACH AND DUODENUM: ICD-10-CM

## 2021-11-19 DIAGNOSIS — K21.9 GASTRO-ESOPHAGEAL REFLUX DISEASE WITHOUT ESOPHAGITIS: ICD-10-CM

## 2021-11-22 PROBLEM — R00.2 PALPITATIONS: Chronic | Status: ACTIVE | Noted: 2021-11-10

## 2021-11-22 PROBLEM — M25.569 PAIN IN UNSPECIFIED KNEE: Chronic | Status: ACTIVE | Noted: 2021-11-10

## 2021-11-22 PROBLEM — M54.9 DORSALGIA, UNSPECIFIED: Chronic | Status: ACTIVE | Noted: 2021-11-10

## 2021-11-22 PROBLEM — Z87.898 PERSONAL HISTORY OF OTHER SPECIFIED CONDITIONS: Chronic | Status: ACTIVE | Noted: 2021-11-10

## 2021-11-22 PROBLEM — R60.0 LOCALIZED EDEMA: Chronic | Status: ACTIVE | Noted: 2021-11-10

## 2021-11-29 ENCOUNTER — RESULT REVIEW (OUTPATIENT)
Age: 51
End: 2021-11-29

## 2021-11-29 ENCOUNTER — OUTPATIENT (OUTPATIENT)
Dept: OUTPATIENT SERVICES | Facility: HOSPITAL | Age: 51
LOS: 1 days | End: 2021-11-29
Payer: COMMERCIAL

## 2021-11-29 ENCOUNTER — APPOINTMENT (OUTPATIENT)
Dept: ORTHOPEDIC SURGERY | Facility: CLINIC | Age: 51
End: 2021-11-29
Payer: MEDICAID

## 2021-11-29 VITALS
HEART RATE: 69 BPM | BODY MASS INDEX: 22.76 KG/M2 | WEIGHT: 145 LBS | HEIGHT: 67 IN | OXYGEN SATURATION: 97 % | TEMPERATURE: 98.1 F

## 2021-11-29 DIAGNOSIS — M17.31 UNILATERAL POST-TRAUMATIC OSTEOARTHRITIS, RIGHT KNEE: ICD-10-CM

## 2021-11-29 DIAGNOSIS — Z98.890 OTHER SPECIFIED POSTPROCEDURAL STATES: Chronic | ICD-10-CM

## 2021-11-29 DIAGNOSIS — Z41.9 ENCOUNTER FOR PROCEDURE FOR PURPOSES OTHER THAN REMEDYING HEALTH STATE, UNSPECIFIED: Chronic | ICD-10-CM

## 2021-11-29 DIAGNOSIS — Z98.84 BARIATRIC SURGERY STATUS: Chronic | ICD-10-CM

## 2021-11-29 PROCEDURE — 99204 OFFICE O/P NEW MOD 45 MIN: CPT | Mod: 25

## 2021-11-29 PROCEDURE — 20610 DRAIN/INJ JOINT/BURSA W/O US: CPT | Mod: RT

## 2021-11-29 PROCEDURE — 73564 X-RAY EXAM KNEE 4 OR MORE: CPT | Mod: 26,LT,RT

## 2021-11-29 PROCEDURE — 73564 X-RAY EXAM KNEE 4 OR MORE: CPT

## 2021-11-29 RX ORDER — ERGOCALCIFEROL 1.25 MG/1
1.25 MG CAPSULE, LIQUID FILLED ORAL
Qty: 12 | Refills: 0 | Status: COMPLETED | COMMUNITY
Start: 2020-01-29 | End: 2021-11-29

## 2021-11-29 RX ORDER — VITAMIN B COMPLEX
2500 TABLET ORAL
Qty: 12 | Refills: 6 | Status: COMPLETED | COMMUNITY
Start: 2020-01-21 | End: 2021-11-29

## 2021-11-29 RX ORDER — FOLIC ACID/MULTIVIT,IRON,MINER .4-18-35
TABLET,CHEWABLE ORAL DAILY
Qty: 60 | Refills: 6 | Status: COMPLETED | COMMUNITY
Start: 2020-01-21 | End: 2021-11-29

## 2021-11-29 RX ORDER — ERYTHROMYCIN 5 MG/G
5 OINTMENT OPHTHALMIC 4 TIMES DAILY
Qty: 1 | Refills: 0 | Status: COMPLETED | COMMUNITY
Start: 2021-09-23 | End: 2021-11-29

## 2021-11-29 RX ORDER — CALCIUM CITRATE/VITAMIN D3 200MG-6.25
200-250 TABLET ORAL DAILY
Qty: 60 | Refills: 6 | Status: COMPLETED | COMMUNITY
Start: 2020-01-21 | End: 2021-11-29

## 2021-11-29 RX ORDER — IRON PS COMPLEX/B12/FOLIC ACID 150-25-1
150-25-1 CAPSULE ORAL
Qty: 6 | Refills: 6 | Status: COMPLETED | COMMUNITY
Start: 2020-01-21 | End: 2021-11-29

## 2021-11-29 RX ORDER — ERGOCALCIFEROL 1.25 MG/1
1.25 MG CAPSULE, LIQUID FILLED ORAL
Qty: 12 | Refills: 0 | Status: COMPLETED | COMMUNITY
Start: 2020-12-23 | End: 2021-11-29

## 2021-11-29 NOTE — HISTORY OF PRESENT ILLNESS
[de-identified] : 51 year old female who sustained a Right knee lateral tibial plateau depressed fracture in 2019. 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 \par SHe has had no Rx in the past year. \par

## 2021-11-29 NOTE — PROCEDURE
[de-identified] : The right knee was prepped with alchohol and ethyl chloride was used to numb the skin. A 3 cc injection with 1 cc xylocaine, 1cc sensoricaine and 1 cc depomedrol , was given without complication into the knee joint. Patient instructed that there may be an inflammatory flare for 24 hrs , to use ice or advil if needed\par \par \par

## 2021-11-29 NOTE — DISCUSSION/SUMMARY
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 \par Plan \par Injection cortisone\par PHysical therapy \par lateral;  brace. \par re evaluate in 2 months consider MRI and scanograms if not symptomatically improved. \par PO Meloxicam

## 2021-11-29 NOTE — PHYSICAL EXAM
[de-identified] : Right knee ROM 2 degrees to 125 degrees\par Severe lateral joint tenderness. \par Neg Lachman Positive valgus stress test.  [de-identified] : x rays show right knee lateral join depression and early OA with signs of previous cement intra-osseus injection.

## 2021-12-03 ENCOUNTER — APPOINTMENT (OUTPATIENT)
Dept: SURGERY | Facility: CLINIC | Age: 51
End: 2021-12-03

## 2021-12-06 PROCEDURE — S2900: CPT

## 2021-12-06 PROCEDURE — 83735 ASSAY OF MAGNESIUM: CPT

## 2021-12-06 PROCEDURE — 86901 BLOOD TYPING SEROLOGIC RH(D): CPT

## 2021-12-06 PROCEDURE — 84702 CHORIONIC GONADOTROPIN TEST: CPT

## 2021-12-06 PROCEDURE — 74240 X-RAY XM UPR GI TRC 1CNTRST: CPT

## 2021-12-06 PROCEDURE — 84100 ASSAY OF PHOSPHORUS: CPT

## 2021-12-06 PROCEDURE — C9399: CPT

## 2021-12-06 PROCEDURE — 86900 BLOOD TYPING SEROLOGIC ABO: CPT

## 2021-12-06 PROCEDURE — 36415 COLL VENOUS BLD VENIPUNCTURE: CPT

## 2021-12-06 PROCEDURE — 86769 SARS-COV-2 COVID-19 ANTIBODY: CPT

## 2021-12-06 PROCEDURE — 80048 BASIC METABOLIC PNL TOTAL CA: CPT

## 2021-12-06 PROCEDURE — 86850 RBC ANTIBODY SCREEN: CPT

## 2021-12-06 PROCEDURE — 85025 COMPLETE CBC W/AUTO DIFF WBC: CPT

## 2021-12-06 PROCEDURE — 85027 COMPLETE CBC AUTOMATED: CPT

## 2022-03-04 ENCOUNTER — APPOINTMENT (OUTPATIENT)
Dept: HEART AND VASCULAR | Facility: CLINIC | Age: 52
End: 2022-03-04
Payer: MEDICAID

## 2022-03-04 VITALS
OXYGEN SATURATION: 97 % | TEMPERATURE: 98 F | RESPIRATION RATE: 14 BRPM | WEIGHT: 142 LBS | DIASTOLIC BLOOD PRESSURE: 70 MMHG | SYSTOLIC BLOOD PRESSURE: 110 MMHG | BODY MASS INDEX: 22.29 KG/M2 | HEIGHT: 67 IN | HEART RATE: 77 BPM

## 2022-03-04 PROCEDURE — 36415 COLL VENOUS BLD VENIPUNCTURE: CPT

## 2022-03-04 PROCEDURE — 93000 ELECTROCARDIOGRAM COMPLETE: CPT

## 2022-03-04 PROCEDURE — 99396 PREV VISIT EST AGE 40-64: CPT

## 2022-03-04 NOTE — HISTORY OF PRESENT ILLNESS
[FreeTextEntry1] : no new c/o, needs form for work\par diet stable\par active\par no tob\par no etoh\par depression stable on meds

## 2022-03-04 NOTE — DISCUSSION/SUMMARY
[FreeTextEntry1] : stable exam, labs and ecg in office\par vaccine staus and PPD requested\par mammo colon utd\par covid vax complete

## 2022-03-04 NOTE — PHYSICAL EXAM
[Well Developed] : well developed [Well Nourished] : well nourished [No Acute Distress] : no acute distress [Normal Conjunctiva] : normal conjunctiva [Normal Venous Pressure] : normal venous pressure [No Carotid Bruit] : no carotid bruit [Normal S1, S2] : normal S1, S2 [No Murmur] : no murmur [No Rub] : no rub [Clear Lung Fields] : clear lung fields [No Gallop] : no gallop [Good Air Entry] : good air entry [No Respiratory Distress] : no respiratory distress  [Soft] : abdomen soft [Non Tender] : non-tender [Normal Bowel Sounds] : normal bowel sounds [No Masses/organomegaly] : no masses/organomegaly [Normal Gait] : normal gait [No Edema] : no edema [No Cyanosis] : no cyanosis [No Rash] : no rash [No Clubbing] : no clubbing [Moves all extremities] : moves all extremities [No Focal Deficits] : no focal deficits [Normal Speech] : normal speech [Alert and Oriented] : alert and oriented [Normal memory] : normal memory [de-identified] : taylor

## 2022-03-07 ENCOUNTER — APPOINTMENT (OUTPATIENT)
Dept: HEART AND VASCULAR | Facility: CLINIC | Age: 52
End: 2022-03-07
Payer: MEDICAID

## 2022-03-07 LAB
25(OH)D3 SERPL-MCNC: 11 NG/ML
ALBUMIN SERPL ELPH-MCNC: 3.8 G/DL
ALP BLD-CCNC: 125 U/L
ALT SERPL-CCNC: 47 U/L
ANION GAP SERPL CALC-SCNC: 10 MMOL/L
AST SERPL-CCNC: 39 U/L
BASOPHILS # BLD AUTO: 0.04 K/UL
BASOPHILS NFR BLD AUTO: 0.6 %
BILIRUB SERPL-MCNC: 0.4 MG/DL
BUN SERPL-MCNC: 13 MG/DL
CALCIUM SERPL-MCNC: 9 MG/DL
CHLORIDE SERPL-SCNC: 104 MMOL/L
CHOLEST SERPL-MCNC: 109 MG/DL
CO2 SERPL-SCNC: 25 MMOL/L
CREAT SERPL-MCNC: 0.7 MG/DL
EGFR: 105 ML/MIN/1.73M2
EOSINOPHIL # BLD AUTO: 0.2 K/UL
EOSINOPHIL NFR BLD AUTO: 3.2 %
ESTIMATED AVERAGE GLUCOSE: 91 MG/DL
GLUCOSE SERPL-MCNC: 113 MG/DL
HBA1C MFR BLD HPLC: 4.8 %
HBV SURFACE AB SER QL: NONREACTIVE
HBV SURFACE AG SER QL: NONREACTIVE
HCT VFR BLD CALC: 35.2 %
HDLC SERPL-MCNC: 58 MG/DL
HGB BLD-MCNC: 10.9 G/DL
IMM GRANULOCYTES NFR BLD AUTO: 0.3 %
LDLC SERPL CALC-MCNC: 41 MG/DL
LYMPHOCYTES # BLD AUTO: 1.9 K/UL
LYMPHOCYTES NFR BLD AUTO: 30.1 %
MAN DIFF?: NORMAL
MCHC RBC-ENTMCNC: 28.8 PG
MCHC RBC-ENTMCNC: 31 GM/DL
MCV RBC AUTO: 92.9 FL
MEV IGG FLD QL IA: 42.2 AU/ML
MEV IGG+IGM SER-IMP: POSITIVE
MONOCYTES # BLD AUTO: 0.37 K/UL
MONOCYTES NFR BLD AUTO: 5.9 %
MUV AB SER-ACNC: POSITIVE
MUV IGG SER QL IA: >300 AU/ML
NEUTROPHILS # BLD AUTO: 3.78 K/UL
NEUTROPHILS NFR BLD AUTO: 59.9 %
NONHDLC SERPL-MCNC: 50 MG/DL
PLATELET # BLD AUTO: 195 K/UL
POTASSIUM SERPL-SCNC: 4.2 MMOL/L
PROT SERPL-MCNC: 6 G/DL
RBC # BLD: 3.79 M/UL
RBC # FLD: 14.2 %
RUBV IGG FLD-ACNC: 22 INDEX
RUBV IGG SER-IMP: POSITIVE
SODIUM SERPL-SCNC: 138 MMOL/L
T4 FREE SERPL-MCNC: 0.8 NG/DL
TRIGL SERPL-MCNC: 47 MG/DL
TSH SERPL-ACNC: 7.1 UIU/ML
VZV AB TITR SER: POSITIVE
VZV IGG SER IF-ACNC: 2457 INDEX
WBC # FLD AUTO: 6.31 K/UL

## 2022-03-07 PROCEDURE — 90746 HEPB VACCINE 3 DOSE ADULT IM: CPT

## 2022-03-07 PROCEDURE — 90471 IMMUNIZATION ADMIN: CPT

## 2022-03-29 ENCOUNTER — APPOINTMENT (OUTPATIENT)
Dept: SURGERY | Facility: CLINIC | Age: 52
End: 2022-03-29
Payer: MEDICAID

## 2022-03-29 VITALS
DIASTOLIC BLOOD PRESSURE: 80 MMHG | WEIGHT: 148 LBS | SYSTOLIC BLOOD PRESSURE: 120 MMHG | HEART RATE: 89 BPM | OXYGEN SATURATION: 96 % | TEMPERATURE: 97.6 F | BODY MASS INDEX: 23.23 KG/M2 | HEIGHT: 67 IN

## 2022-03-29 PROCEDURE — 99213 OFFICE O/P EST LOW 20 MIN: CPT

## 2022-04-04 ENCOUNTER — APPOINTMENT (OUTPATIENT)
Dept: HEART AND VASCULAR | Facility: CLINIC | Age: 52
End: 2022-04-04
Payer: MEDICAID

## 2022-04-04 VITALS
BODY MASS INDEX: 20.92 KG/M2 | DIASTOLIC BLOOD PRESSURE: 90 MMHG | HEIGHT: 68 IN | WEIGHT: 138 LBS | OXYGEN SATURATION: 97 % | HEART RATE: 95 BPM | TEMPERATURE: 98 F | SYSTOLIC BLOOD PRESSURE: 122 MMHG

## 2022-04-04 PROCEDURE — 99211 OFF/OP EST MAY X REQ PHY/QHP: CPT

## 2022-04-11 PROBLEM — K44.9 HIATAL HERNIA WITH GASTROESOPHAGEAL REFLUX: Status: ACTIVE | Noted: 2019-10-22

## 2022-05-24 ENCOUNTER — APPOINTMENT (OUTPATIENT)
Dept: SURGERY | Facility: CLINIC | Age: 52
End: 2022-05-24
Payer: MEDICAID

## 2022-05-24 VITALS
WEIGHT: 144 LBS | OXYGEN SATURATION: 97 % | HEIGHT: 68 IN | SYSTOLIC BLOOD PRESSURE: 132 MMHG | TEMPERATURE: 97.6 F | HEART RATE: 67 BPM | DIASTOLIC BLOOD PRESSURE: 79 MMHG | BODY MASS INDEX: 21.82 KG/M2

## 2022-05-24 PROCEDURE — 99213 OFFICE O/P EST LOW 20 MIN: CPT

## 2022-05-24 NOTE — HISTORY OF PRESENT ILLNESS
[de-identified] : Pt is a 50 y/o F about 3 yrs s/p Nissen fundoplication and HH repair, previous RYGB surgery in 2014 with , who presents today feeling well here for routine f/u. States overall, she is doing very well since last visit and gained 6 lbs. States she is tolerating her diet, focusing on consuming adequate daily protein. States for the past 2 weeks, she has had steatorrhea. After diet review, pt admits to having fried chicken and french fries. Explained to pt having fried, oily, or fatty foods can lead to greasy stools which she was not aware of. Pt understands that given her bypass sxhx, these types of foods should be avoided. Denies any n,v, epigastric abd pn, reports cramping abd pain likely associated with her recent looser BM's. States she is not in any abd pn right now and this pain is infrequent, will call if pain worsens. Pt without n,v, and is moving bowels 1-2x daily so not acute concerns and likely diet related. Reports her rx'd vitamins which she had requested were not covered by insurance and she had to pay out of pocket and was not able to afford them. States at home she has MV, b12, iron, and is taking these medications as directed. Reports doing some walking for exercise. Labs reviewed, TSH 14, states she is on 125mcg managed by her PCP, will refer to endocrinology. Pt with NOAH despite taking oral iron, will refer to heme. Vit d low, reviewed and sent rx ergocalciferol x 12 weeks. Pt reports for the past month she has been waking from her sleep with b/l LE myalgia- inner thighs, back of calves. States she was given muscle relaxers by her PCP but would like to see someone for this problem. Will provide referral to neurology. Pt opted to not see nutrition today.

## 2022-05-24 NOTE — PLAN
[FreeTextEntry1] : rx vit d\par vit daily, will purchase vit once she is able to afford them\par refer to hematology, endocrinology, neurology\par f/u 3 mo\par pt opted to not see nutrition today

## 2022-05-25 LAB
25(OH)D3 SERPL-MCNC: 7.6 NG/ML
A-TOCOPHEROL VIT E SERPL-MCNC: 5.9 MG/L
ALBUMIN SERPL ELPH-MCNC: 3.8 G/DL
ALP BLD-CCNC: 101 U/L
ALT SERPL-CCNC: 15 U/L
ANION GAP SERPL CALC-SCNC: 11 MMOL/L
AST SERPL-CCNC: 21 U/L
BASOPHILS # BLD AUTO: 0.05 K/UL
BASOPHILS NFR BLD AUTO: 0.7 %
BETA+GAMMA TOCOPHEROL SERPL-MCNC: 1 MG/L
BILIRUB SERPL-MCNC: 0.5 MG/DL
BUN SERPL-MCNC: 16 MG/DL
CA-I SERPL-SCNC: 4.9 MG/DL
CALCIUM SERPL-MCNC: 9.2 MG/DL
CALCIUM SERPL-MCNC: 9.2 MG/DL
CHLORIDE SERPL-SCNC: 104 MMOL/L
CHOLEST SERPL-MCNC: 150 MG/DL
CO2 SERPL-SCNC: 26 MMOL/L
CREAT SERPL-MCNC: 0.76 MG/DL
EGFR: 95 ML/MIN/1.73M2
EOSINOPHIL # BLD AUTO: 0.21 K/UL
EOSINOPHIL NFR BLD AUTO: 2.9 %
ESTIMATED AVERAGE GLUCOSE: 94 MG/DL
FOLATE SERPL-MCNC: 10.7 NG/ML
GLUCOSE SERPL-MCNC: 87 MG/DL
HBA1C MFR BLD HPLC: 4.9 %
HCT VFR BLD CALC: 36.4 %
HDLC SERPL-MCNC: 69 MG/DL
HGB BLD-MCNC: 10.8 G/DL
IMM GRANULOCYTES NFR BLD AUTO: 0.3 %
INR PPP: 1.01 RATIO
IRON SATN MFR SERPL: 23 %
IRON SERPL-MCNC: 74 UG/DL
LDLC SERPL CALC-MCNC: 71 MG/DL
LYMPHOCYTES # BLD AUTO: 2.3 K/UL
LYMPHOCYTES NFR BLD AUTO: 31.3 %
MAN DIFF?: NORMAL
MCHC RBC-ENTMCNC: 28.8 PG
MCHC RBC-ENTMCNC: 29.7 GM/DL
MCV RBC AUTO: 97.1 FL
MONOCYTES # BLD AUTO: 0.54 K/UL
MONOCYTES NFR BLD AUTO: 7.3 %
NEUTROPHILS # BLD AUTO: 4.24 K/UL
NEUTROPHILS NFR BLD AUTO: 57.5 %
NONHDLC SERPL-MCNC: 81 MG/DL
PARATHYROID HORMONE INTACT: 61 PG/ML
PLATELET # BLD AUTO: 206 K/UL
POTASSIUM SERPL-SCNC: 4.6 MMOL/L
PREALB SERPL NEPH-MCNC: 16 MG/DL
PROT SERPL-MCNC: 6.2 G/DL
PT BLD: 11.9 SEC
RBC # BLD: 3.75 M/UL
RBC # FLD: 14.4 %
SODIUM SERPL-SCNC: 140 MMOL/L
TIBC SERPL-MCNC: 318 UG/DL
TRIGL SERPL-MCNC: 50 MG/DL
TSH SERPL-ACNC: 14.9 UIU/ML
UIBC SERPL-MCNC: 244 UG/DL
VIT A SERPL-MCNC: 26.9 UG/DL
VIT B1 SERPL-MCNC: 96.8 NMOL/L
VIT B12 SERPL-MCNC: 676 PG/ML
WBC # FLD AUTO: 7.36 K/UL
ZINC SERPL-MCNC: 65 UG/DL

## 2022-05-25 NOTE — PLAN
[FreeTextEntry1] : labs\par nutrition\par rx vit\par f/u 3mo\par increase protein\par increase exercise

## 2022-05-25 NOTE — HISTORY OF PRESENT ILLNESS
Hospitalist Discharge Summary     Patient ID:  Madeleine Boyle  [id number]  13 y.o.  1972  3/8/2020    PCP on record: None    Admit date: 3/8/2020  Discharge date and time: 3/11/2020    DISCHARGE DIAGNOSIS:    See below    CONSULTATIONS:  IP CONSULT TO NEUROLOGY  IP CONSULT TO HOSPITALIST    Excerpted HPI from H&P of Shoshana Acosta MD:    Frank Wiley is a 56 y.o.  female who presents with right-sided weakness in the upper extremity and lower extremity as well as slurred speech and facial droop. Symptoms started about 2 weeks ago and has become progressively worse. She reports that this is the first time she is having some speech deficit. And also right upper extremity weakness. Patient denies any visual change, headache. She has history of multiple sclerosis and follows neurology outpatient. The ED physician spoke with Dr. Melly Hammonds who is the neurologist on call recommended that the patient should be given 1000 mg of Solu-Medrol daily for 3 days for likely MS flare and to obtain an MRI of the brain with and without contrast and also MRI of the thoracic and cervical spines. Patient also reports chronic constipation and urinary hesitancy. She denies any frequency and dysuria.  ______________________________________________________________________  DISCHARGE SUMMARY/HOSPITAL COURSE:  for full details see H&P, daily progress notes, labs, consult notes.      Right side weakness / MS exacerbation POA  MRI of brain:  IMPRESSION: Slight increase of multiple T2 hyperintensities/MS plaques, several  of which exhibit diffusion restriction.  -Neurology consulted appreciate recommendations  -completed Solu-Medrol 1000 mg daily x3   -PO steroid taper on DC  -symptoms improved   -HH PT/OT on DC  -Continue Tecfidera  -Patient reports improvement in her generalized pain  -likely home with HH next 24 hrs  -received ativan 1mg q12 in hospital. Patient is being weaned from [de-identified] : Pt is a 50 y/o F about 3 yrs s/p Nissen fundoplication and HH repair, previous RYGB surgery in 2014 with , who presents today feeling well here for routine f/u. States she is eating much better overall and has relief of her epigastric pain that she previously was experiencing. Pt states she is consuming protein shakes during the day and is working on eating more, recommended every 2-3 hrs. Pt is very happy with the sx as she is now finally able to eat with out pain n/v. Pt wt has slightly increased, informed pt to keep working on her daily protein intake to gain a little more weight which she also wants to do. Pt admits to not being very physically active, recommended pt to partake in cardio and strength training 3-5x/week. Pt denies any n,v,c,d,reflux, abd pain. Pt states she has not been vit compliant, states she needs refills on all of her vit. Explained to pt these vit are OTC but she prefers rx's as it is cheaper with her insurance. Will send rx refills. \par  sedatives/benzos OP. Discussed importance of using only as needed with plan of DC after short taper. ?aspiration  -appreciate SLP  -MBS showed single episode of small vol aspiration of thin and nectar thick liquids  -educated on strategies to reduce aspiration risk  -no overt signs of aspiration w multiple trials of thin liquids     Constipation  -Dulcolax 5 mg p.o. daily  -Add MiraLAX     Chronic pain syndrome   -Continue Cymbalta at home dose  -Continue gabapentin home dose  -Consider referral to pain clinic on discharge     Urinary hesitancy   -UA negative, likely from MS exacerbation     Anxiety and depression  -On Cymbalta    _______________________________________________________________________  Patient seen and examined by me on discharge day. Pertinent Findings:  Gen:    Not in distress  Chest: Clear lungs  CVS:   Regular rhythm. No edema  Abd:  Soft, not distended, not tender  Neuro:  Alert, oriented x 3  _______________________________________________________________________  DISCHARGE MEDICATIONS:   Current Discharge Medication List      START taking these medications    Details   predniSONE (DELTASONE) 20 mg tablet Take 2 tabs by mouth with breakfast for 2 days, then take 1 tab for 2 days, then take 1/2 tab for 2 days then discontinue  Qty: 7 Tab, Refills: 0         CONTINUE these medications which have NOT CHANGED    Details   DULoxetine (CYMBALTA) 60 mg capsule Take 1 Cap by mouth daily. Indications: neuropathic pain  Qty: 30 Cap, Refills: 1    Associated Diagnoses: Chronic pain syndrome; MS (multiple sclerosis) ([address] Tobacco abuse      gabapentin (NEURONTIN) 400 mg capsule Take 2 Caps by mouth three (3) times daily. Max Daily Amount: 2,400 mg. Qty: 120 Cap, Refills: 5    Associated Diagnoses: Chronic pain syndrome; MS (multiple sclerosis) ([address] Tobacco abuse      dimethyl fumarate (TECFIDERA) 240 mg cpDR Take 240 mg by mouth two (2) times a day.   Qty: 120 Cap, Refills: 3    Associated Diagnoses: MS (multiple sclerosis) ([facility] Body aches; Chronic pain syndrome; Benzodiazepine dependence (HCC)      FLUoxetine (PROZAC) 40 mg capsule Take 1 Cap by mouth daily. Qty: 30 Cap, Refills: 1    Associated Diagnoses: Chronic pain syndrome; MS (multiple sclerosis) ([facility] Tobacco abuse      ondansetron (ZOFRAN ODT) 4 mg disintegrating tablet Take 1 Tab by mouth every eight (8) hours as needed for Nausea or Vomiting. Qty: 10 Tab, Refills: 0      senna-docusate (PERICOLACE) 8.6-50 mg per tablet Take 2 Tabs by mouth daily. Qty: 15 Tab, Refills: 0    Associated Diagnoses: Slow transit constipation      ibuprofen (MOTRIN) 600 mg tablet Take 1 Tab by mouth every six (6) hours as needed for Pain. Qty: 20 Tab, Refills: 0               Patient Follow Up Instructions:    Activity: PT/OT per Home Health  Diet: dental soft diet, think liquids, no straws, single cup sips with oral hold, strict upright during meals  Wound Care: None needed    Follow-up with as below  Follow-up tests/labs none pending  Follow-up Information     Follow up With Specialties Details Why Contact Info    Pt own med    Please make sure pt home med (dimethyl fumarate) goes home with patient on discharge    None    None ([phone]) Patient stated that they have no PCP      Herminio Heredia, [address]  [phone]          ________________________________________________________________    Risk of deterioration: Moderate    Condition at Discharge:  Stable  __________________________________________________________________    Disposition  Home with family and home health services    ____________________________________________________________________    Code Status: Full Code  ___________________________________________________________________      Total time in minutes spent coordinating this discharge (includes going over instructions, follow-up, prescriptions, and preparing report for sign off to her PCP) :  35 minutes    Signed:  Juan Still DO

## 2022-06-27 ENCOUNTER — APPOINTMENT (OUTPATIENT)
Dept: ENDOCRINOLOGY | Facility: CLINIC | Age: 52
End: 2022-06-27

## 2022-06-27 VITALS
BODY MASS INDEX: 21.52 KG/M2 | DIASTOLIC BLOOD PRESSURE: 73 MMHG | OXYGEN SATURATION: 96 % | TEMPERATURE: 97.2 F | HEIGHT: 68 IN | WEIGHT: 142 LBS | HEART RATE: 67 BPM | SYSTOLIC BLOOD PRESSURE: 111 MMHG

## 2022-06-27 DIAGNOSIS — Z83.3 FAMILY HISTORY OF DIABETES MELLITUS: ICD-10-CM

## 2022-06-27 DIAGNOSIS — Z83.49 FAMILY HISTORY OF OTHER ENDOCRINE, NUTRITIONAL AND METABOLIC DISEASES: ICD-10-CM

## 2022-06-27 PROCEDURE — 99204 OFFICE O/P NEW MOD 45 MIN: CPT

## 2022-06-27 NOTE — PHYSICAL EXAM
[Alert] : alert [No Acute Distress] : no acute distress [No Respiratory Distress] : no respiratory distress [No Accessory Muscle Use] : no accessory muscle use [Clear to Auscultation] : lungs were clear to auscultation bilaterally [Normal S1, S2] : normal S1 and S2 [Normal Rate] : heart rate was normal [Normal Bowel Sounds] : normal bowel sounds [Not Tender] : non-tender [Soft] : abdomen soft [Normal Gait] : normal gait [Normal Strength/Tone] : muscle strength and tone were normal [No Motor Deficits] : the motor exam was normal [Normal Affect] : the affect was normal [Normal Insight/Judgement] : insight and judgment were intact [Normal Mood] : the mood was normal [de-identified] : enlarged thyroid gland

## 2022-06-27 NOTE — REVIEW OF SYSTEMS
[Fatigue] : fatigue [Depression] : depression [Stress] : stress [Decreased Appetite] : appetite not decreased [Dysphagia] : no dysphagia [Dysphonia] : no dysphonia [Chest Pain] : no chest pain [Palpitations] : no palpitations [Shortness Of Breath] : no shortness of breath [Nausea] : no nausea [Vomiting] : no vomiting [Joint Pain] : no joint pain [Cold Intolerance] : no cold intolerance [Heat Intolerance] : no heat intolerance

## 2022-06-27 NOTE — CONSULT LETTER
[Dear  ___] : Dear ~DAXA, [Consult Letter:] : I had the pleasure of evaluating your patient, [unfilled]. [Please see my note below.] : Please see my note below. [Consult Closing:] : Thank you very much for allowing me to participate in the care of this patient.  If you have any questions, please do not hesitate to contact me. [Sincerely,] : Sincerely, [FreeTextEntry3] : Cornelia Gutierrez MD

## 2022-06-27 NOTE — HISTORY OF PRESENT ILLNESS
[FreeTextEntry1] : Patient is a 52 yo woman establishing endocrine care for hypothyroidism.\par \par DIagnosed with hypothyroidism approximately 2012.  She was gaining weight and that prompted doctors to check thyroid blood work. Started on Lt4 and doses have been adjusted from 75 - 125 mcg po daily.  Used to be managed by Dr. Das who was treating her for the hypothyroidism.\par Currently prescribed Lt4 125 mcg po daily.  Misses about 3 doses a week.  \par Reports stressors like COVID and unemployment. \par Has low energy and feels depressed. \par Sister: thyroidectomy for goiter\par No exposures to lithium/amiodarone/radiation

## 2022-06-27 NOTE — ASSESSMENT
[Levothyroxine] : The patient was instructed to take Levothyroxine on an empty stomach, separate from vitamins, and wait at least 30 minutes before eating [FreeTextEntry1] : Patient is a 50 yo woman with hypothyroidism establishing endocrine care\par \par 1. Hypothyroid\par -patient with long standing hypothyroidism with reported non-adherence to medications\par -recently TSH levels have been increased\par -states she is on a dose of Lt4 125 mcg po daily but can miss up to 3 doses weekly\par -the importance of medication adherence was addressed. She can set her phone alarm as a reminder\par -patient is under stress due to COVID unemployment and family issues but will try to improve her adherence \par -clinically hypothyroid with main symptoms being fatigue\par -repeat TFTs today\par \par 2. Enlarged thyroid gland\par -refer for thyroid US\par \par Follow up in 3 months

## 2022-06-27 NOTE — REASON FOR VISIT
[Initial Evaluation] : an initial evaluation [Hypothyroidism] : hypothyroidism [FreeTextEntry2] : Rosalva Cooley PA

## 2022-06-28 ENCOUNTER — TRANSCRIPTION ENCOUNTER (OUTPATIENT)
Age: 52
End: 2022-06-28

## 2022-06-28 ENCOUNTER — APPOINTMENT (OUTPATIENT)
Dept: HEMATOLOGY ONCOLOGY | Facility: CLINIC | Age: 52
End: 2022-06-28
Payer: MEDICAID

## 2022-06-28 VITALS
RESPIRATION RATE: 14 BRPM | DIASTOLIC BLOOD PRESSURE: 68 MMHG | BODY MASS INDEX: 21.52 KG/M2 | WEIGHT: 142 LBS | HEART RATE: 58 BPM | HEIGHT: 68 IN | SYSTOLIC BLOOD PRESSURE: 108 MMHG | OXYGEN SATURATION: 96 % | TEMPERATURE: 96.3 F

## 2022-06-28 DIAGNOSIS — D50.9 IRON DEFICIENCY ANEMIA, UNSPECIFIED: ICD-10-CM

## 2022-06-28 LAB
T3 SERPL-MCNC: 102 NG/DL
T4 FREE SERPL-MCNC: 0.8 NG/DL
TSH SERPL-ACNC: 8.94 UIU/ML

## 2022-06-28 PROCEDURE — 99203 OFFICE O/P NEW LOW 30 MIN: CPT | Mod: 25

## 2022-06-29 LAB
25(OH)D3 SERPL-MCNC: 23.9 NG/ML
BASOPHILS # BLD AUTO: 0.04 K/UL
BASOPHILS NFR BLD AUTO: 0.7 %
EOSINOPHIL # BLD AUTO: 0.21 K/UL
EOSINOPHIL NFR BLD AUTO: 3.7 %
ERYTHROCYTE [SEDIMENTATION RATE] IN BLOOD BY WESTERGREN METHOD: 6 MM/HR
FERRITIN SERPL-MCNC: 21 NG/ML
HAPTOGLOB SERPL-MCNC: 119 MG/DL
HCT VFR BLD CALC: 37.6 %
HGB BLD-MCNC: 11.1 G/DL
IMM GRANULOCYTES NFR BLD AUTO: 0.2 %
IRON SATN MFR SERPL: 15 %
IRON SERPL-MCNC: 44 UG/DL
LDH SERPL-CCNC: 234 U/L
LYMPHOCYTES # BLD AUTO: 1.7 K/UL
LYMPHOCYTES NFR BLD AUTO: 29.7 %
MAN DIFF?: NORMAL
MCHC RBC-ENTMCNC: 28.8 PG
MCHC RBC-ENTMCNC: 29.5 GM/DL
MCV RBC AUTO: 97.7 FL
MONOCYTES # BLD AUTO: 0.42 K/UL
MONOCYTES NFR BLD AUTO: 7.3 %
NEUTROPHILS # BLD AUTO: 3.35 K/UL
NEUTROPHILS NFR BLD AUTO: 58.4 %
PLATELET # BLD AUTO: 187 K/UL
RBC # BLD: 3.85 M/UL
RBC # FLD: 13.5 %
THYROGLOB AB SERPL-ACNC: ABNORMAL IU/ML
THYROPEROXIDASE AB SERPL IA-ACNC: 3922 IU/ML
TIBC SERPL-MCNC: 297 UG/DL
UIBC SERPL-MCNC: 253 UG/DL
VIT B12 SERPL-MCNC: 599 PG/ML
WBC # FLD AUTO: 5.73 K/UL

## 2022-06-29 NOTE — CONSULT LETTER
[Dear  ___] : Dear ~DAXA, [Consult Letter:] : I had the pleasure of evaluating your patient, [unfilled]. [Please see my note below.] : Please see my note below. [Consult Closing:] : Thank you very much for allowing me to participate in the care of this patient.  If you have any questions, please do not hesitate to contact me. [Sincerely,] : Sincerely, [FreeTextEntry3] : Miguelina Dumont MD\par

## 2022-06-29 NOTE — ASSESSMENT
[FreeTextEntry1] : Repeat blood work patient's hemoglobin is up to 11.5 grams per deciliter\par \par Since her hemoglobin is over 10 g/dL, I encourage patient to continue oral iron supplementation, as it is unlikely for the insurance to pay for intravenous iron.\par \par \par Patient encouraged to take her p.o. vitamin D.\par \par Follow-up here as needed.

## 2022-08-15 ENCOUNTER — APPOINTMENT (OUTPATIENT)
Dept: OTOLARYNGOLOGY | Facility: CLINIC | Age: 52
End: 2022-08-15

## 2022-08-23 ENCOUNTER — APPOINTMENT (OUTPATIENT)
Dept: SURGERY | Facility: CLINIC | Age: 52
End: 2022-08-23

## 2022-09-01 ENCOUNTER — APPOINTMENT (OUTPATIENT)
Dept: OPHTHALMOLOGY | Facility: CLINIC | Age: 52
End: 2022-09-01

## 2022-09-06 ENCOUNTER — APPOINTMENT (OUTPATIENT)
Dept: HEART AND VASCULAR | Facility: CLINIC | Age: 52
End: 2022-09-06

## 2022-09-06 VITALS
TEMPERATURE: 97.1 F | HEIGHT: 68 IN | HEART RATE: 64 BPM | SYSTOLIC BLOOD PRESSURE: 110 MMHG | WEIGHT: 140 LBS | BODY MASS INDEX: 21.22 KG/M2 | RESPIRATION RATE: 14 BRPM | OXYGEN SATURATION: 96 % | DIASTOLIC BLOOD PRESSURE: 70 MMHG

## 2022-09-06 DIAGNOSIS — Z23 ENCOUNTER FOR IMMUNIZATION: ICD-10-CM

## 2022-09-06 PROCEDURE — 99211 OFF/OP EST MAY X REQ PHY/QHP: CPT

## 2022-09-13 ENCOUNTER — APPOINTMENT (OUTPATIENT)
Dept: NEUROLOGY | Facility: CLINIC | Age: 52
End: 2022-09-13

## 2022-09-26 ENCOUNTER — APPOINTMENT (OUTPATIENT)
Dept: ENDOCRINOLOGY | Facility: CLINIC | Age: 52
End: 2022-09-26

## 2022-10-03 ENCOUNTER — APPOINTMENT (OUTPATIENT)
Dept: NEUROLOGY | Facility: CLINIC | Age: 52
End: 2022-10-03

## 2023-01-24 NOTE — DISCHARGE NOTE PROVIDER - NSDCHOSPICE_GEN_A_CORE
[facility] VISIT FOLLOW UP NOTE    PCP: Elsa Ferrari MD     CHIEF COMPLAINT: fatty liver, elevated liver enzymes    HPI: This is a 36 y.o. White female with PMH noted below, presenting for follow up of above    Serological workup was negative for Jorge Alberto's, alpha-1 antitrypsin deficiency, hemochromatosis,  and viral hepatitis.   + BK (titer improving), normal ASMA, IgG and AMA    Prior serologic workup:   Lab Results   Component Value Date    SMOOTHMUSCAB Negative 1:40 01/17/2023    AMAIFA Negative 1:40 10/15/2021    IGGSERUM 952 01/17/2023    ANASCREEN Positive (A) 01/17/2023    FERRITIN 34 10/15/2021    FESATURATED 8 (L) 10/15/2021    CERULOPLSM 32.0 10/15/2021    HEPBSAG Negative 03/07/2022    HEPCAB Negative 08/03/2021    HEPAIGM Negative 08/03/2021     Risk factors for fatty liver include obesity, HTN    Liver fibrosis staging:  -- fibroscan 10/2021 F2, S2 (kPA 8.7, )  -- MRI elsto 1/2023 noted F0, mild steatosis     Interval HPI: Presents today alone. Liver labs normalized 6/2022-10/2022 but now increased again 1/2023  Previously weight gain of ~80 lbs in ~2021, was pregnant and had a son in March 2021 then daughter 10/2022  Current weight 228 lbs,   Previously following low carb  Previously Did have significant pre eclampsia and difficulty with HTN after delivery around ~3/2021 and 4/2021 - around the time of more significant liver enzyme elevation   Dec COVID 2022  MRI elasto with fatty liver but no fibrosis     Labs done 1/2023 show elevated transaminase levels (elevated 3/2021-3/2022, normalized 6/2022-10/2022, elevated 1/2023  Platelets WNL, alk phos WNL  Synthetic liver functioning WNL    Lab Results   Component Value Date    ALT 68 (H) 01/17/2023    AST 38 01/17/2023    ALKPHOS 63 01/17/2023    BILITOT 0.5 01/17/2023    ALBUMIN 4.2 01/17/2023    INR 1.0 01/17/2023     01/17/2023     MRI elasto 1/2023 noted mild steatosis, no fibrosis. Normal spleen and liver size  "    Denies family history of liver disease . Minimal Alcohol consumption, see below  Social History     Substance and Sexual Activity   Alcohol Use Not Currently    Comment: couple of servings per year, no daily or heavy alcohol use        Immunity to Hep A and B - will check with next labs, repeat after vaccination        Allergy and medication list reviewed and updated     PMHX:  has a past medical history of Acid reflux, Allergy, Pre-eclampsia, severe, postpartum condition (3/29/2021), Severe hypertension affecting pregnancy in third trimester (3/22/2021), and Severe pre-eclampsia in third trimester (3/22/2021).    PSHX:  has a past surgical history that includes Adenoidectomy (age 15); biopsy of breast  (Right, 2014); Cholecystectomy (); Esophagogastroduodenoscopy (N/A, 2018);  section (N/A, 3/24/2021); and  section (N/A, 10/10/2022).    FAMILY HISTORY: Updated and reviewed in EPIC    SOCIAL HISTORY:   Social History     Substance and Sexual Activity   Alcohol Use Not Currently    Comment: couple of servings per year, no daily or heavy alcohol use        Social History     Substance and Sexual Activity   Drug Use No       ROS:   GENERAL: Denies fatigue  CARDIOVASCULAR: Denies edema  GI: Denies abdominal pain  SKIN: Denies rash, itching   NEURO: Denies confusion, memory loss, or mood changes    PHYSICAL EXAM:   Friendly White female, in no acute distress; alert and oriented to person, place and time  VITALS: BP (!) 149/91 (BP Location: Right arm, Patient Position: Sitting, BP Method: Medium (Automatic))   Pulse 79   Temp 97.1 °F (36.2 °C) (Oral)   Resp 18   Ht 5' 4" (1.626 m)   Wt 103.7 kg (228 lb 9.9 oz)   SpO2 96%   BMI 39.24 kg/m²   EYES: Sclerae anicteric  GI: Soft, non-tender, non-distended. No ascites.  EXTREMITIES:  No edema.  SKIN: Warm and dry. No jaundice. No telangectasias noted. No palmar erythema.  NEURO:  No asterixis.  PSYCH:  Thought and speech pattern " appropriate. Behavior normal      EDUCATION:  See instructions discussed with patient in Instructions section of the After Visit Summary     ASSESSMENT & PLAN:  36 y.o. White female with:  1.  Fatty liver, likely related to  metabolic risk factors   - MRI elasto noted fatty liver, no splenomegaly   - transaminases elevated recently after normalization   - Synthetic liver function WNL  - risk factors for fatty liver disease include obesity, HTN  -- Immunity to Hep A and B : Will check immunity markers for HBV/HAV  and arrange for vaccination if needed  -- MRI elasto 1/2023 mild steatosis, no fibrosis   -- Recommendations discussed with patient:  Limit alcohol consumption, no more than 1 serving(s) of alcohol in any day (1 serving is 5 ounces of wine, 12 ounces of beer, or 1.5 ounces of liquor) and do NOT recommend any daily alcohol use    2 Weight loss goal of 30 lbs  3. Low carb/sugar, high fiber and protein diet, limit your carb intake to LESS than 30-45 grams of carbs with a meal or LESS than 5-10 grams with any snack   4. Exercise, 5 days per week, 30 minutes per day, as tolerated  5. Recommend good cholesterol, blood pressure, blood sugar levels     2. Elevated liver enzymes, + BK  -- recent COVID could have caused elevated liver enzymes   -- BK improving and liver enzymes normalized for some time, so less convincing for autoimmune hep. Will repeat labs in 3 and 6 months and determine f/u plan from then     3. Obesity, HTN   -- Body mass index is 39.24 kg/m².   -- increases risk for fatty liver        Labs in 3 months then   Follow up in about 6 months (around 7/24/2023). With labs and MRI elasto before   Orders Placed This Encounter   Procedures    MR Elastography    Hepatic Function Panel    BK Screen w/Reflex    Anti-Smooth Muscle Antibody    IgG    Hepatic Function Panel    Hepatitis A antibody, IgG    Hepatitis B Core Antibody, Total    Hepatitis B Surface Ab, Qualitative    Ambulatory referral/consult  to Rheumatology          Thank you for allowing me to participate in the care of Bushra Olmos PATTIE Gonzalez    I spent a total of 30 minutes on the day of the visit.This includes face to face time and non-face to face time preparing to see the patient (eg, review of tests), obtaining and/or reviewing separately obtained history, documenting clinical information in the electronic or other health record, independently interpreting results and communicating results to the patient/family/caregiver, and coordinating care.         CC'ed note to:    No

## 2023-02-08 ENCOUNTER — APPOINTMENT (OUTPATIENT)
Dept: ENDOCRINOLOGY | Facility: CLINIC | Age: 53
End: 2023-02-08

## 2023-02-27 ENCOUNTER — APPOINTMENT (OUTPATIENT)
Dept: HEART AND VASCULAR | Facility: CLINIC | Age: 53
End: 2023-02-27
Payer: MEDICAID

## 2023-02-27 VITALS
DIASTOLIC BLOOD PRESSURE: 70 MMHG | HEIGHT: 68 IN | OXYGEN SATURATION: 96 % | TEMPERATURE: 97.5 F | WEIGHT: 135 LBS | HEART RATE: 77 BPM | BODY MASS INDEX: 20.46 KG/M2 | SYSTOLIC BLOOD PRESSURE: 108 MMHG

## 2023-02-27 DIAGNOSIS — Z11.1 ENCOUNTER FOR SCREENING FOR RESPIRATORY TUBERCULOSIS: ICD-10-CM

## 2023-02-27 DIAGNOSIS — E03.9 HYPOTHYROIDISM, UNSPECIFIED: ICD-10-CM

## 2023-02-27 PROCEDURE — 86580 TB INTRADERMAL TEST: CPT

## 2023-02-27 PROCEDURE — 99214 OFFICE O/P EST MOD 30 MIN: CPT

## 2023-02-27 PROCEDURE — 90715 TDAP VACCINE 7 YRS/> IM: CPT

## 2023-02-27 NOTE — PHYSICAL EXAM
[Well Developed] : well developed [Well Nourished] : well nourished [No Acute Distress] : no acute distress [Normal Conjunctiva] : normal conjunctiva [Normal Venous Pressure] : normal venous pressure [No Carotid Bruit] : no carotid bruit [Normal S1, S2] : normal S1, S2 [No Murmur] : no murmur [No Rub] : no rub [No Gallop] : no gallop [Clear Lung Fields] : clear lung fields [Good Air Entry] : good air entry [No Respiratory Distress] : no respiratory distress  [Soft] : abdomen soft [Non Tender] : non-tender [No Masses/organomegaly] : no masses/organomegaly [Normal Bowel Sounds] : normal bowel sounds [Normal Gait] : normal gait [No Edema] : no edema [No Cyanosis] : no cyanosis [No Clubbing] : no clubbing [No Rash] : no rash [Moves all extremities] : moves all extremities [No Focal Deficits] : no focal deficits [Normal Speech] : normal speech [Alert and Oriented] : alert and oriented [Normal memory] : normal memory [de-identified] : taylor

## 2023-02-27 NOTE — DISCUSSION/SUMMARY
[FreeTextEntry1] : stable exam\par form filled\par ppd placed R forearm\par Synthroid script given has f/u with endo\par

## 2023-03-07 ENCOUNTER — APPOINTMENT (OUTPATIENT)
Dept: ENDOCRINOLOGY | Facility: CLINIC | Age: 53
End: 2023-03-07

## 2023-03-29 ENCOUNTER — APPOINTMENT (OUTPATIENT)
Dept: HEART AND VASCULAR | Facility: CLINIC | Age: 53
End: 2023-03-29

## 2023-07-12 ENCOUNTER — APPOINTMENT (OUTPATIENT)
Dept: ENDOCRINOLOGY | Facility: CLINIC | Age: 53
End: 2023-07-12
Payer: COMMERCIAL

## 2023-07-12 VITALS
OXYGEN SATURATION: 98 % | DIASTOLIC BLOOD PRESSURE: 69 MMHG | BODY MASS INDEX: 18.94 KG/M2 | TEMPERATURE: 97.8 F | SYSTOLIC BLOOD PRESSURE: 102 MMHG | HEIGHT: 68 IN | WEIGHT: 125 LBS | HEART RATE: 99 BPM

## 2023-07-12 DIAGNOSIS — E55.9 VITAMIN D DEFICIENCY, UNSPECIFIED: ICD-10-CM

## 2023-07-12 DIAGNOSIS — Z98.84 BARIATRIC SURGERY STATUS: ICD-10-CM

## 2023-07-12 DIAGNOSIS — E04.9 NONTOXIC GOITER, UNSPECIFIED: ICD-10-CM

## 2023-07-12 DIAGNOSIS — R73.03 PREDIABETES.: ICD-10-CM

## 2023-07-12 PROCEDURE — 99214 OFFICE O/P EST MOD 30 MIN: CPT

## 2023-07-12 NOTE — REVIEW OF SYSTEMS
[Recent Weight Loss (___ Lbs)] : recent weight loss: [unfilled] lbs [Chest Pain] : no chest pain [Palpitations] : no palpitations [Shortness Of Breath] : no shortness of breath [Nausea] : nausea [Gas/Bloating] : gas/bloating [Depression] : no depression

## 2023-07-12 NOTE — ASSESSMENT
[FreeTextEntry1] : Patient is a 51 yo woman here for thyroid follow up\par \par 1. Hypothyroid\par -patient has had a very tough year due to weight loss and inability to tolerate PO foods. She has followed up with bariatric surgery and reported having normal EGDs. She has not had lung cancer screening which I've referred her to discuss with PCP Dr. Chong\par -her BMI today is 19 and on exam, she has temporal wasting\par -due to lack of follow up Lt4 was not renewed.  She requires lab draw now\par -check TSH, free T4 levels and based on those results, I will have a better idea of what dose she requires\par -enlarged gland, needs US; another referral given to her today\par \par 2. Hx of prediabetes\par -with the significant weight loss, I will repeat an A1c today\par \par 3. Bariatric surgery\par -check and replete vitamin D\par \par Follow up in 4 months

## 2023-07-12 NOTE — HISTORY OF PRESENT ILLNESS
[FreeTextEntry1] : Patient is a 51 yo woman following up for hypothyroidism. Her first/last visit was June 2022\par \par DIagnosed with hypothyroidism approximately 2012. She was gaining weight and that prompted doctors to check thyroid blood work. Started on Lt4 and doses have been adjusted from 75 - 125 mcg po daily. Used to be managed by Dr. Das who was treating her for the hypothyroidism.\par Currently prescribed Lt4 125 mcg po daily. Misses about 3 doses a week. 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 Courtesy refill have been provided despite non-adherence to endocrine follow up\par She has had significant weight loss due to inabilitiy to tolerate PO intake.  She had a hiatal hernia and that was corrected. There is acid reflux and there was an ulcer/fistula that was also corrected.  Patient has had EGD

## 2023-07-12 NOTE — PHYSICAL EXAM
[Alert] : alert [No Acute Distress] : no acute distress [No Respiratory Distress] : no respiratory distress [No Accessory Muscle Use] : no accessory muscle use [Clear to Auscultation] : lungs were clear to auscultation bilaterally [Normal S1, S2] : normal S1 and S2 [Normal Rate] : heart rate was normal [Normal Bowel Sounds] : normal bowel sounds [Not Tender] : non-tender [Soft] : abdomen soft [Normal Gait] : normal gait [Normal Strength/Tone] : muscle strength and tone were normal [No Motor Deficits] : the motor exam was normal [Normal Affect] : the affect was normal [Normal Insight/Judgement] : insight and judgment were intact [Normal Mood] : the mood was normal [de-identified] : enlarged thyroid gland

## 2023-07-13 LAB
25(OH)D3 SERPL-MCNC: 14.8 NG/ML
ESTIMATED AVERAGE GLUCOSE: 85 MG/DL
HBA1C MFR BLD HPLC: 4.6 %
T4 FREE SERPL-MCNC: 0.7 NG/DL
TSH SERPL-ACNC: 10.5 UIU/ML

## 2023-07-17 ENCOUNTER — LABORATORY RESULT (OUTPATIENT)
Age: 53
End: 2023-07-17

## 2023-07-17 ENCOUNTER — NON-APPOINTMENT (OUTPATIENT)
Age: 53
End: 2023-07-17

## 2023-07-17 ENCOUNTER — APPOINTMENT (OUTPATIENT)
Dept: HEART AND VASCULAR | Facility: CLINIC | Age: 53
End: 2023-07-17
Payer: COMMERCIAL

## 2023-07-17 VITALS
TEMPERATURE: 98.1 F | DIASTOLIC BLOOD PRESSURE: 70 MMHG | HEIGHT: 68 IN | BODY MASS INDEX: 20 KG/M2 | SYSTOLIC BLOOD PRESSURE: 96 MMHG | WEIGHT: 132 LBS | OXYGEN SATURATION: 96 %

## 2023-07-17 DIAGNOSIS — Z00.00 ENCOUNTER FOR GENERAL ADULT MEDICAL EXAMINATION W/OUT ABNORMAL FINDINGS: ICD-10-CM

## 2023-07-17 DIAGNOSIS — R05.9 COUGH, UNSPECIFIED: ICD-10-CM

## 2023-07-17 PROCEDURE — 99396 PREV VISIT EST AGE 40-64: CPT

## 2023-07-17 PROCEDURE — 36415 COLL VENOUS BLD VENIPUNCTURE: CPT

## 2023-07-17 PROCEDURE — 93000 ELECTROCARDIOGRAM COMPLETE: CPT

## 2023-07-17 NOTE — REVIEW OF SYSTEMS
[Weight Loss (___ Lbs)] : [unfilled] ~Ulb weight loss [Lower Ext Edema] : lower extremity edema [Cough] : cough [Wheezing] : wheezing [Joint Pain] : joint pain [Joint Stiffness] : joint stiffness [Muscle Cramps] : muscle cramps [Anxiety] : anxiety [Under Stress] : under stress [Negative] : Heme/Lymph

## 2023-07-17 NOTE — HISTORY OF PRESENT ILLNESS
[FreeTextEntry1] : prior notes reviewed\par poor diet\par active/ not exercising\par still smoking/ not ready to quit\par occ etoh\par mood down

## 2023-07-17 NOTE — PHYSICAL EXAM
[Well Developed] : well developed [Well Nourished] : well nourished [No Acute Distress] : no acute distress [Normal Conjunctiva] : normal conjunctiva [Normal Venous Pressure] : normal venous pressure [No Carotid Bruit] : no carotid bruit [Normal S1, S2] : normal S1, S2 [No Murmur] : no murmur [No Rub] : no rub [No Gallop] : no gallop [Clear Lung Fields] : clear lung fields [Good Air Entry] : good air entry [No Respiratory Distress] : no respiratory distress  [Soft] : abdomen soft [Non Tender] : non-tender [No Masses/organomegaly] : no masses/organomegaly [Normal Bowel Sounds] : normal bowel sounds [Normal Gait] : normal gait [No Cyanosis] : no cyanosis [No Clubbing] : no clubbing [No Rash] : no rash [Moves all extremities] : moves all extremities [No Focal Deficits] : no focal deficits [Normal Speech] : normal speech [Alert and Oriented] : alert and oriented [Normal memory] : normal memory [de-identified] : taylor [de-identified] : 1+

## 2023-07-17 NOTE — DISCUSSION/SUMMARY
[EKG obtained to assist in diagnosis and management of assessed problem(s)] : EKG obtained to assist in diagnosis and management of assessed problem(s) [FreeTextEntry1] : stable exam, labs in office\par maamo/colon utd\par needs gyn\par pulmonary and vascular eval

## 2023-07-18 LAB
ALBUMIN SERPL ELPH-MCNC: 3 G/DL
ALP BLD-CCNC: 93 U/L
ALT SERPL-CCNC: 10 U/L
ANION GAP SERPL CALC-SCNC: 8 MMOL/L
APPEARANCE: CLEAR
AST SERPL-CCNC: 14 U/L
BILIRUB SERPL-MCNC: 0.3 MG/DL
BILIRUBIN URINE: NEGATIVE
BLOOD URINE: NEGATIVE
BUN SERPL-MCNC: 10 MG/DL
CALCIUM SERPL-MCNC: 8.6 MG/DL
CHLORIDE SERPL-SCNC: 104 MMOL/L
CHOLEST SERPL-MCNC: 144 MG/DL
CO2 SERPL-SCNC: 29 MMOL/L
COLOR: NORMAL
CREAT SERPL-MCNC: 0.63 MG/DL
CREAT SPEC-SCNC: 117 MG/DL
EGFR: 107 ML/MIN/1.73M2
ESTIMATED AVERAGE GLUCOSE: 91 MG/DL
GLUCOSE QUALITATIVE U: NEGATIVE MG/DL
GLUCOSE SERPL-MCNC: 73 MG/DL
HBA1C MFR BLD HPLC: 4.8 %
HDLC SERPL-MCNC: 62 MG/DL
KETONES URINE: ABNORMAL MG/DL
LDLC SERPL CALC-MCNC: 66 MG/DL
LEUKOCYTE ESTERASE URINE: ABNORMAL
MICROALBUMIN 24H UR DL<=1MG/L-MCNC: <1.2 MG/DL
MICROALBUMIN/CREAT 24H UR-RTO: NORMAL MG/G
NITRITE URINE: NEGATIVE
NONHDLC SERPL-MCNC: 83 MG/DL
PH URINE: 6
POTASSIUM SERPL-SCNC: 4.1 MMOL/L
PROT SERPL-MCNC: 5.3 G/DL
PROTEIN URINE: NEGATIVE MG/DL
SODIUM SERPL-SCNC: 141 MMOL/L
SPECIFIC GRAVITY URINE: 1.02
TRIGL SERPL-MCNC: 90 MG/DL
TSH SERPL-ACNC: 20.1 UIU/ML
UROBILINOGEN URINE: 1 MG/DL

## 2023-08-14 ENCOUNTER — APPOINTMENT (OUTPATIENT)
Dept: ULTRASOUND IMAGING | Facility: HOSPITAL | Age: 53
End: 2023-08-14
Payer: COMMERCIAL

## 2023-08-14 ENCOUNTER — OUTPATIENT (OUTPATIENT)
Dept: OUTPATIENT SERVICES | Facility: HOSPITAL | Age: 53
LOS: 1 days | End: 2023-08-14
Payer: COMMERCIAL

## 2023-08-14 DIAGNOSIS — Z41.9 ENCOUNTER FOR PROCEDURE FOR PURPOSES OTHER THAN REMEDYING HEALTH STATE, UNSPECIFIED: Chronic | ICD-10-CM

## 2023-08-14 DIAGNOSIS — Z98.84 BARIATRIC SURGERY STATUS: Chronic | ICD-10-CM

## 2023-08-14 DIAGNOSIS — Z98.890 OTHER SPECIFIED POSTPROCEDURAL STATES: Chronic | ICD-10-CM

## 2023-08-14 PROCEDURE — 76536 US EXAM OF HEAD AND NECK: CPT | Mod: 26

## 2023-08-14 PROCEDURE — 76536 US EXAM OF HEAD AND NECK: CPT

## 2023-08-17 ENCOUNTER — APPOINTMENT (OUTPATIENT)
Dept: VASCULAR SURGERY | Facility: CLINIC | Age: 53
End: 2023-08-17
Payer: COMMERCIAL

## 2023-08-17 ENCOUNTER — NON-APPOINTMENT (OUTPATIENT)
Age: 53
End: 2023-08-17

## 2023-08-17 VITALS
HEART RATE: 77 BPM | WEIGHT: 130 LBS | BODY MASS INDEX: 19.7 KG/M2 | SYSTOLIC BLOOD PRESSURE: 105 MMHG | HEIGHT: 68 IN | DIASTOLIC BLOOD PRESSURE: 67 MMHG

## 2023-08-17 PROCEDURE — 93970 EXTREMITY STUDY: CPT

## 2023-08-17 PROCEDURE — 99204 OFFICE O/P NEW MOD 45 MIN: CPT

## 2023-08-17 RX ORDER — CALCIUM CITRATE/VITAMIN D3 200MG-6.25
200-250 TABLET ORAL DAILY
Qty: 60 | Refills: 6 | Status: DISCONTINUED | COMMUNITY
Start: 2020-12-22 | End: 2023-08-17

## 2023-08-17 RX ORDER — VITAMIN B COMPLEX
2500 TABLET ORAL
Qty: 4 | Refills: 6 | Status: DISCONTINUED | COMMUNITY
Start: 2020-12-22 | End: 2023-08-17

## 2023-08-17 RX ORDER — MULTIVITAMIN/IRON/FOLIC ACID 18MG-0.4MG
TABLET ORAL
Qty: 60 | Refills: 6 | Status: DISCONTINUED | COMMUNITY
Start: 2022-03-29 | End: 2023-08-17

## 2023-08-17 RX ORDER — ERGOCALCIFEROL 1.25 MG/1
1.25 MG CAPSULE, LIQUID FILLED ORAL
Qty: 12 | Refills: 0 | Status: DISCONTINUED | COMMUNITY
Start: 2022-05-24 | End: 2023-08-17

## 2023-08-17 RX ORDER — CALCIUM CITRATE/VITAMIN D3 200MG-6.25
200-250 TABLET ORAL DAILY
Qty: 60 | Refills: 6 | Status: DISCONTINUED | COMMUNITY
Start: 2022-03-29 | End: 2023-08-17

## 2023-08-17 RX ORDER — VITAMIN B COMPLEX
2500 TABLET ORAL
Qty: 12 | Refills: 6 | Status: DISCONTINUED | COMMUNITY
Start: 2022-03-29 | End: 2023-08-17

## 2023-08-17 NOTE — ADDENDUM
[FreeTextEntry1] : This note was written by Anita FAIRBANKS, acting as a scribe for Dr. Hayden Hanna.  I, Dr. Hayden Hanna, have read and attest that all the information, medical decision-making, and discharge instructions within are true and accurate.   I agree with the above. Ms. Claire has severe bialteral LE swelling for several months. Had prior BLE venous ablation many years ago. Venous duplex negative for SVT/DVT, and reflux. Will obtain CT venogram abdomen and pelvis to further evaluation for iliac vein compression that may be contributing. Compression stockings were also prescribed.  I, Dr. Hayden Hanna, personally performed the evaluation and management (E/M) services for this new patient.  That E/M includes conducting the initial examination, assessing all conditions, and establishing the plan of care.  Today, my ACP, Anita FAIRBANKS, was here to observe my evaluation and management services for this patient to be followed going forward.

## 2023-08-17 NOTE — PROCEDURE
[FreeTextEntry1] : BL Venous doppler was done in the office demonstrating no SVT/DVT, no deep reflux, BL GSV not visualized, severe edema noted, enlarged left groin lymph node.

## 2023-08-17 NOTE — PHYSICAL EXAM
[Respiratory Effort] : normal respiratory effort [Normal Heart Sounds] : normal heart sounds [2+] : left 2+ [Alert] : alert [Calm] : calm [Ankle Swelling (On Exam)] : present [Ankle Swelling Bilaterally] : severe [Ankle Swelling On The Right] : mild [Varicose Veins Of Lower Extremities] : bilaterally [] : not present [Abdomen Tenderness] : ~T ~M No abdominal tenderness [de-identified] : WN/WD, NAD [de-identified] : NC/AT [de-identified] : supple [de-identified] : +FROM 5/5x4 [de-identified] : small varicose veins throughout both LEs noted, both legs are well perfused

## 2023-08-17 NOTE — REVIEW OF SYSTEMS
[Negative] : Heme/Lymph [As Noted in HPI] : as noted in HPI [Limb Swelling] : limb swelling [Leg Claudication] : no intermittent leg claudication [Limb Pain] : no limb pain

## 2023-08-17 NOTE — HISTORY OF PRESENT ILLNESS
[FreeTextEntry1] : 52yoF, smoker with history of morbid obesity, s/p gastric bypass in 2014, hypothyroidism, varicose veins, s/p sclerotherapy and ?RFA who is referred by Dr. Chong to be evaluated for bilateral lower extremities edema. Patient states that she developed legs edema ~3-4 months ago and it progressively became worse. The swelling doesn't go down even during a night when patient is supine. She denies change in her diet, new medications, history of DVT. She is active and denies claudication, rest pain, skin changes.

## 2023-08-17 NOTE — ASSESSMENT
[Foot care/Footwear] : foot care/footwear [FreeTextEntry1] : 52yoF, smoker with history of morbid obesity, s/p gastric bypass in 2014, hypothyroidism, varicose veins, s/p sclerotherapy and ?RFA who is referred by Dr. Chong to be evaluated for bilateral lower extremities edema. Patient with new legs edema that is not relieved with legs elevation and rest. History of bilateral GSV closure. Both legs are well perfused, palpable peripheral pulses, small varicosities noted throughout, no skin changes. BL Venous doppler was done in the office demonstrating no SVT/DVT, no deep reflux, BL GSV not visualized, severe edema noted, enlarged left groin lymph node. We discussed the findings and explained that she doesn't have any vascular issues at this time requiring intervention. We recommend to obtain CTV A/P to evaluate pelvic veins and to r/o compression syndrome. Patient agrees and would like to proceed with it. We will obtain prior authorization from her insurance. Also recommend to wear compression stockings to control her legs edema, Rx was provided. Once CTV is done we will discuss the findings with the patient.

## 2023-08-23 ENCOUNTER — APPOINTMENT (OUTPATIENT)
Dept: CT IMAGING | Facility: HOSPITAL | Age: 53
End: 2023-08-23

## 2023-08-23 ENCOUNTER — OUTPATIENT (OUTPATIENT)
Dept: OUTPATIENT SERVICES | Facility: HOSPITAL | Age: 53
LOS: 1 days | End: 2023-08-23
Payer: COMMERCIAL

## 2023-08-23 DIAGNOSIS — Z98.84 BARIATRIC SURGERY STATUS: Chronic | ICD-10-CM

## 2023-08-23 DIAGNOSIS — Z41.9 ENCOUNTER FOR PROCEDURE FOR PURPOSES OTHER THAN REMEDYING HEALTH STATE, UNSPECIFIED: Chronic | ICD-10-CM

## 2023-08-23 DIAGNOSIS — Z98.890 OTHER SPECIFIED POSTPROCEDURAL STATES: Chronic | ICD-10-CM

## 2023-08-23 PROCEDURE — 74174 CTA ABD&PLVS W/CONTRAST: CPT

## 2023-08-23 PROCEDURE — 74174 CTA ABD&PLVS W/CONTRAST: CPT | Mod: 26

## 2023-08-28 ENCOUNTER — NON-APPOINTMENT (OUTPATIENT)
Age: 53
End: 2023-08-28

## 2023-08-31 ENCOUNTER — APPOINTMENT (OUTPATIENT)
Dept: PULMONOLOGY | Facility: CLINIC | Age: 53
End: 2023-08-31
Payer: COMMERCIAL

## 2023-08-31 VITALS
HEART RATE: 79 BPM | SYSTOLIC BLOOD PRESSURE: 99 MMHG | OXYGEN SATURATION: 96 % | DIASTOLIC BLOOD PRESSURE: 62 MMHG | WEIGHT: 130 LBS | TEMPERATURE: 97.2 F | BODY MASS INDEX: 19.7 KG/M2 | HEIGHT: 68 IN

## 2023-08-31 DIAGNOSIS — F17.200 NICOTINE DEPENDENCE, UNSPECIFIED, UNCOMPLICATED: ICD-10-CM

## 2023-08-31 PROCEDURE — 99204 OFFICE O/P NEW MOD 45 MIN: CPT | Mod: 25

## 2023-08-31 PROCEDURE — 99406 BEHAV CHNG SMOKING 3-10 MIN: CPT | Mod: GC

## 2023-08-31 RX ORDER — VARENICLINE TARTRATE 25 MG
0.5 MG X 11 & KIT ORAL
Qty: 1 | Refills: 0 | Status: ACTIVE | COMMUNITY
Start: 2023-08-31 | End: 1900-01-01

## 2023-08-31 NOTE — REVIEW OF SYSTEMS
[Edema] : edema [Fever] : no fever [Fatigue] : no fatigue [Chills] : no chills [Poor Appetite] : no poor appetite [Sinus Problems] : no sinus problems [Cough] : no cough [Hemoptysis] : no hemoptysis [Sputum] : no sputum [Pleuritic Pain] : no pleuritic pain [Chest Discomfort] : no chest discomfort [Claudication] : no claudication [Orthopnea] : no orthopnea [Palpitations] : no palpitations

## 2023-08-31 NOTE — ASSESSMENT
[FreeTextEntry1] : Data reviewed:  PA/lat CXR [facility] 2021 personally reviewed: clear lungs PA/lat CXR outside 7/2023 personally reviewed : clear lungs  Impression: # Current smoker, >50 pack years # Possible obstructive lung disease # Childhood asthma  Plan: - interested in getting PFT done, however unable to complete today, in dental pain. Defer for future data, patient will call - start chantix for smoking cessation. Instructions given:  Days 1-3: 0.5mg once a day Days 4-7: 0.5mg twice daily Days 8- treatment end: 1mg twice daily Will assess response at next visit - patient will return when new insurance is established, per her preference  -- Attending Addendum  Seen and examined by me and agree w above. Heavy smoker sent to us for evaluation. Distracted today by severe tooth pain. Denies dyspnea or chronic cough but has history of childhood asthma and has used albuterol with chest infections. PFT is warranted but her insurance is changing and she will call us back after her Medicaid is established. LDCT indicated and accepted. Smoking cessation discussed, will rx Chantix. Return when new insurance is established. -- I attest that prior to beginning the CT Lung Cancer Screening process:    Reviewed and discussed with patient about the benefits, limitations, and potential harms of lung cancer screening, including: false positive rates which may lead to additional diagnostic testing and /or invasive diagnostic procedures to further evaluate findings, false-negatives which may delay or prevent diagnosis and treatment, anxiety associated with test results, serial lung cancer LDCT screening and/ or additional testing, incidental findings, over diagnosis and total radiation exposure.   Reviewed with and counseled the patient on the importance of adherence to annual lung cancer Low-Dose CT screening, the impact of co morbidities and ability or willingness to undergo diagnosis and treatment, including invasive diagnostic procedures, if there is a positive finding.   Discussed patient's willingness to undergo invasive testing and surgical interventions if indicated clinically.   Reviewed with the patient about the importance of smoking cessation and/or maintaining cigarette smoking abstinence.   Discussed with patient that the lung cancer screening is not a one-time test but a process that involves periodic follow up Low-Dose CT exams over time to look for newly developing lung cancer.   The patient is asymptomatic (no signs/symptoms of lung cancer such as fever, chest pain, new shortness of breath, new or changing cough, coughing up blood, or unexplained significant weight loss).    As part of the lung cancer screening program, multi-disciplinary clinical team will review the results of the CT scan and make recommendations based on established screening protocol.    Ms. LANDON has agreed and decided to proceed with lung cancer screening using Low-Dose CT and consented undergoing additional tests (including additional imaging and/or invasive diagnostic procedures) as recommended by the multi-disciplinary clinical team should this be clinically indicated.

## 2023-08-31 NOTE — CONSULT LETTER
[Dear  ___] : Dear  [unfilled], [Courtesy Letter:] : I had the pleasure of seeing your patient, [unfilled], in my office today. [Please see my note below.] : Please see my note below. [Consult Closing:] : Thank you very much for allowing me to participate in the care of this patient.  If you have any questions, please do not hesitate to contact me. [Sincerely,] : Sincerely, [FreeTextEntry2] : Keith Chong MD [address] [place] [address], [address] [address] [FreeTextEntry3] : Lia Bonds MD, [facility]

## 2023-08-31 NOTE — HISTORY OF PRESENT ILLNESS
[Current] : current [>= 20 pack years] : >= 20 pack years [TextBox_4] : 08/31/2023: Asked to evaluate patient by Dr Aguilar for dyspnea. Patient was under impression that this visit was for her chronically swollen legs. Patient is a current smoker, about 2 packs/day, smoking since 16 years old. Notes that she had childhood asthma, and has occasional ED visits for bronchitis, is usually treated with albuterol. Denies dyspnea, is able to perform her daily activities without limitation, occasional dry cough. Currently reports significant pain due to toothache. Interested in smoking cessation and lung cancer screening.  [TextBox_11] : 2 [ESS] : 0

## 2023-08-31 NOTE — PHYSICAL EXAM
[Normal Appearance] : normal appearance [Supple] : supple [No JVD] : no jvd [Normal Rate/Rhythm] : normal rate/rhythm [Normal Pulses] : normal pulses [Normal S1, S2] : normal s1, s2 [No Murmurs] : no murmurs [No Resp Distress] : no resp distress [Clear to Auscultation Bilaterally] : clear to auscultation bilaterally [No Abnormalities] : no abnormalities [No Clubbing] : no clubbing [TextBox_2] : Sitting comfortably. Swollen bilateral LE with varicose veins present. No asymmetry or erythema.

## 2023-09-11 ENCOUNTER — APPOINTMENT (OUTPATIENT)
Dept: VASCULAR SURGERY | Facility: CLINIC | Age: 53
End: 2023-09-11

## 2023-10-25 RX ORDER — CITALOPRAM HYDROBROMIDE 40 MG/1
40 TABLET, FILM COATED ORAL
Qty: 30 | Refills: 3 | Status: ACTIVE | COMMUNITY
Start: 2020-01-15 | End: 1900-01-01

## 2023-11-13 ENCOUNTER — APPOINTMENT (OUTPATIENT)
Dept: ENDOCRINOLOGY | Facility: CLINIC | Age: 53
End: 2023-11-13

## 2023-11-15 ENCOUNTER — APPOINTMENT (OUTPATIENT)
Dept: ENDOCRINOLOGY | Facility: CLINIC | Age: 53
End: 2023-11-15
Payer: MEDICAID

## 2023-11-15 VITALS
DIASTOLIC BLOOD PRESSURE: 72 MMHG | OXYGEN SATURATION: 99 % | SYSTOLIC BLOOD PRESSURE: 107 MMHG | BODY MASS INDEX: 19.7 KG/M2 | TEMPERATURE: 98.3 F | WEIGHT: 130 LBS | HEIGHT: 68 IN | HEART RATE: 81 BPM

## 2023-11-15 DIAGNOSIS — E04.1 NONTOXIC SINGLE THYROID NODULE: ICD-10-CM

## 2023-11-15 DIAGNOSIS — E03.9 HYPOTHYROIDISM, UNSPECIFIED: ICD-10-CM

## 2023-11-15 PROCEDURE — 99214 OFFICE O/P EST MOD 30 MIN: CPT

## 2023-11-16 LAB
T4 FREE SERPL-MCNC: 1.1 NG/DL
TSH SERPL-ACNC: 9.81 UIU/ML

## 2023-11-21 ENCOUNTER — APPOINTMENT (OUTPATIENT)
Dept: PULMONOLOGY | Facility: CLINIC | Age: 53
End: 2023-11-21
Payer: MEDICAID

## 2023-11-21 PROCEDURE — 94060 EVALUATION OF WHEEZING: CPT

## 2023-11-21 PROCEDURE — 94729 DIFFUSING CAPACITY: CPT

## 2023-11-21 PROCEDURE — 94727 GAS DIL/WSHOT DETER LNG VOL: CPT

## 2023-11-22 ENCOUNTER — APPOINTMENT (OUTPATIENT)
Dept: ULTRASOUND IMAGING | Facility: HOSPITAL | Age: 53
End: 2023-11-22
Payer: MEDICAID

## 2023-11-22 ENCOUNTER — RESULT REVIEW (OUTPATIENT)
Age: 53
End: 2023-11-22

## 2023-11-22 ENCOUNTER — OUTPATIENT (OUTPATIENT)
Dept: OUTPATIENT SERVICES | Facility: HOSPITAL | Age: 53
LOS: 1 days | End: 2023-11-22
Payer: COMMERCIAL

## 2023-11-22 DIAGNOSIS — Z98.84 BARIATRIC SURGERY STATUS: Chronic | ICD-10-CM

## 2023-11-22 DIAGNOSIS — Z41.9 ENCOUNTER FOR PROCEDURE FOR PURPOSES OTHER THAN REMEDYING HEALTH STATE, UNSPECIFIED: Chronic | ICD-10-CM

## 2023-11-22 DIAGNOSIS — Z98.890 OTHER SPECIFIED POSTPROCEDURAL STATES: Chronic | ICD-10-CM

## 2023-11-22 PROCEDURE — 76536 US EXAM OF HEAD AND NECK: CPT | Mod: 26

## 2023-11-22 PROCEDURE — 76536 US EXAM OF HEAD AND NECK: CPT

## 2023-11-29 ENCOUNTER — APPOINTMENT (OUTPATIENT)
Dept: PULMONOLOGY | Facility: CLINIC | Age: 53
End: 2023-11-29
Payer: MEDICAID

## 2023-11-29 VITALS — BODY MASS INDEX: 19.7 KG/M2 | HEIGHT: 68 IN | WEIGHT: 130 LBS

## 2023-11-29 DIAGNOSIS — F17.210 NICOTINE DEPENDENCE, CIGARETTES, UNCOMPLICATED: ICD-10-CM

## 2023-11-29 PROCEDURE — 99406 BEHAV CHNG SMOKING 3-10 MIN: CPT

## 2023-11-29 PROCEDURE — G0296 VISIT TO DETERM LDCT ELIG: CPT

## 2023-11-29 RX ORDER — NICOTINE 21 MG/24HR
21 PATCH, TRANSDERMAL 24 HOURS TRANSDERMAL DAILY
Qty: 1 | Refills: 1 | Status: ACTIVE | COMMUNITY
Start: 2023-11-29 | End: 1900-01-01

## 2023-12-08 ENCOUNTER — OUTPATIENT (OUTPATIENT)
Dept: OUTPATIENT SERVICES | Facility: HOSPITAL | Age: 53
LOS: 1 days | End: 2023-12-08
Payer: COMMERCIAL

## 2023-12-08 ENCOUNTER — APPOINTMENT (OUTPATIENT)
Dept: CT IMAGING | Facility: HOSPITAL | Age: 53
End: 2023-12-08

## 2023-12-08 DIAGNOSIS — Z98.890 OTHER SPECIFIED POSTPROCEDURAL STATES: Chronic | ICD-10-CM

## 2023-12-08 DIAGNOSIS — Z41.9 ENCOUNTER FOR PROCEDURE FOR PURPOSES OTHER THAN REMEDYING HEALTH STATE, UNSPECIFIED: Chronic | ICD-10-CM

## 2023-12-08 DIAGNOSIS — Z98.84 BARIATRIC SURGERY STATUS: Chronic | ICD-10-CM

## 2023-12-08 PROCEDURE — 71271 CT THORAX LUNG CANCER SCR C-: CPT

## 2023-12-08 PROCEDURE — 71271 CT THORAX LUNG CANCER SCR C-: CPT | Mod: 26

## 2024-02-05 RX ORDER — DIAZEPAM 5 MG/1
5 TABLET ORAL
Qty: 60 | Refills: 0 | Status: ACTIVE | COMMUNITY
Start: 2020-06-29 | End: 1900-01-01

## 2024-02-20 ENCOUNTER — APPOINTMENT (OUTPATIENT)
Dept: ENDOCRINOLOGY | Facility: CLINIC | Age: 54
End: 2024-02-20

## 2025-03-07 NOTE — H&P ADULT - CLICK TO LAUNCH ORM
Caller: Dione Sosa    Relationship: Self    Best call back number:     [phone] (Mobile)       What medication are you requesting: ANTIBIOTIC - IF CEFDINIR, CAN SHE TAKE WITH OTHER MEDS     What are your current symptoms: GREEN MUCUS, DRAINAGE, COUGH     How long have you been experiencing symptoms:     Have you had these symptoms before:    [x] Yes  [] No    Have you been treated for these symptoms before:   [x] Yes  [] No    If a prescription is needed, what is your preferred pharmacy and phone number:    [id number] - [place], [place] - [facility] - [phone]  - [phone]  [phone]            .